# Patient Record
Sex: FEMALE | Race: BLACK OR AFRICAN AMERICAN
[De-identification: names, ages, dates, MRNs, and addresses within clinical notes are randomized per-mention and may not be internally consistent; named-entity substitution may affect disease eponyms.]

---

## 2018-09-20 ENCOUNTER — HOSPITAL ENCOUNTER (EMERGENCY)
Dept: HOSPITAL 94 - ER | Age: 43
Discharge: HOME | End: 2018-09-20
Payer: MEDICARE

## 2018-09-20 VITALS — BODY MASS INDEX: 21.3 KG/M2 | HEIGHT: 65 IN | WEIGHT: 127.87 LBS

## 2018-09-20 VITALS — DIASTOLIC BLOOD PRESSURE: 119 MMHG | SYSTOLIC BLOOD PRESSURE: 185 MMHG

## 2018-09-20 DIAGNOSIS — I10: ICD-10-CM

## 2018-09-20 DIAGNOSIS — Z88.8: ICD-10-CM

## 2018-09-20 DIAGNOSIS — K40.90: Primary | ICD-10-CM

## 2018-09-20 DIAGNOSIS — F17.200: ICD-10-CM

## 2018-09-20 DIAGNOSIS — Z98.51: ICD-10-CM

## 2018-09-20 LAB
ALBUMIN SERPL BCP-MCNC: 3.4 G/DL (ref 3.4–5)
ALBUMIN/GLOB SERPL: 0.8 {RATIO} (ref 1.1–1.5)
ALP SERPL-CCNC: 52 IU/L (ref 46–116)
ALT SERPL W P-5'-P-CCNC: 26 U/L (ref 12–78)
ANION GAP SERPL CALCULATED.3IONS-SCNC: 8 MMOL/L (ref 8–16)
AST SERPL W P-5'-P-CCNC: 20 U/L (ref 10–37)
BACTERIA URNS QL MICRO: (no result) /HPF
BASOPHILS # BLD AUTO: 0.1 X10'3 (ref 0–0.2)
BASOPHILS NFR BLD AUTO: 0.8 % (ref 0–1)
BILIRUB SERPL-MCNC: 0.3 MG/DL (ref 0.1–1)
BUN SERPL-MCNC: 20 MG/DL (ref 7–18)
BUN/CREAT SERPL: 18.7 (ref 6.6–38)
CALCIUM SERPL-MCNC: 8.9 MG/DL (ref 8.5–10.1)
CHLORIDE SERPL-SCNC: 105 MMOL/L (ref 99–107)
CLARITY UR: CLEAR
CO2 SERPL-SCNC: 28.2 MMOL/L (ref 24–32)
COLOR UR: YELLOW
CREAT SERPL-MCNC: 1.07 MG/DL (ref 0.4–0.9)
DEPRECATED SQUAMOUS URNS QL MICRO: (no result) /LPF
EOSINOPHIL # BLD AUTO: 0.2 X10'3 (ref 0–0.9)
EOSINOPHIL NFR BLD AUTO: 2.6 % (ref 0–6)
ERYTHROCYTE [DISTWIDTH] IN BLOOD BY AUTOMATED COUNT: 15.1 % (ref 11.5–14.5)
GFR SERPL CREATININE-BSD FRML MDRD: 57 ML/MIN
GLUCOSE SERPL-MCNC: 75 MG/DL (ref 70–104)
GLUCOSE UR STRIP-MCNC: NEGATIVE MG/DL
HCG UR QL: NEGATIVE
HCT VFR BLD AUTO: 34.6 % (ref 35–45)
HGB BLD-MCNC: 11.7 G/DL (ref 12–16)
HGB UR QL STRIP: (no result)
INR PPP: 1 INR
KETONES UR STRIP-MCNC: NEGATIVE MG/DL
LEUKOCYTE ESTERASE UR QL STRIP: NEGATIVE
LYMPHOCYTES # BLD AUTO: 3.5 X10'3 (ref 1.1–4.8)
LYMPHOCYTES NFR BLD AUTO: 53.3 % (ref 21–51)
MCH RBC QN AUTO: 29.2 PG (ref 27–31)
MCHC RBC AUTO-ENTMCNC: 33.7 % (ref 33–36.5)
MCV RBC AUTO: 86.8 FL (ref 78–98)
MONOCYTES # BLD AUTO: 0.5 X10'3 (ref 0–0.9)
MONOCYTES NFR BLD AUTO: 7.5 % (ref 2–12)
MUCOUS THREADS URNS QL MICRO: (no result) /LPF
NEUTROPHILS # BLD AUTO: 2.4 X10'3 (ref 1.8–7.7)
NEUTROPHILS NFR BLD AUTO: 35.8 % (ref 42–75)
NITRITE UR QL STRIP: NEGATIVE
PH UR STRIP: 6 [PH] (ref 4.8–8)
PLATELET # BLD AUTO: 209 X10'3 (ref 140–440)
PMV BLD AUTO: 9.1 FL (ref 7.4–10.4)
POTASSIUM SERPL-SCNC: 3 MMOL/L (ref 3.5–5.1)
PROT SERPL-MCNC: 7.6 G/DL (ref 6.4–8.2)
PROT UR QL STRIP: (no result) MG/DL
PROTHROMBIN TIME: 10.7 SECONDS (ref 9–12)
RBC # BLD AUTO: 3.99 X10'6 (ref 4.2–5.6)
RBC #/AREA URNS HPF: (no result) /HPF (ref 0–2)
SODIUM SERPL-SCNC: 141 MMOL/L (ref 135–145)
SP GR UR STRIP: >=1.03 (ref 1–1.03)
T VAGINALIS URNS QL MICRO: (no result) /HPF
URN COLLECT METHOD CLASS: (no result)
UROBILINOGEN UR STRIP-MCNC: 0.2 E.U/DL (ref 0.2–1)
WBC # BLD AUTO: 6.6 X10'3 (ref 4.5–11)
WBC #/AREA URNS HPF: (no result) /HPF (ref 0–4)

## 2018-09-20 PROCEDURE — 74177 CT ABD & PELVIS W/CONTRAST: CPT

## 2018-09-20 PROCEDURE — 36415 COLL VENOUS BLD VENIPUNCTURE: CPT

## 2018-09-20 PROCEDURE — 96375 TX/PRO/DX INJ NEW DRUG ADDON: CPT

## 2018-09-20 PROCEDURE — 85610 PROTHROMBIN TIME: CPT

## 2018-09-20 PROCEDURE — 85025 COMPLETE CBC W/AUTO DIFF WBC: CPT

## 2018-09-20 PROCEDURE — 81001 URINALYSIS AUTO W/SCOPE: CPT

## 2018-09-20 PROCEDURE — 81025 URINE PREGNANCY TEST: CPT

## 2018-09-20 PROCEDURE — 80053 COMPREHEN METABOLIC PANEL: CPT

## 2018-09-20 PROCEDURE — 96374 THER/PROPH/DIAG INJ IV PUSH: CPT

## 2018-09-20 PROCEDURE — 99285 EMERGENCY DEPT VISIT HI MDM: CPT

## 2021-04-30 ENCOUNTER — HOSPITAL ENCOUNTER (INPATIENT)
Dept: HOSPITAL 94 - ER | Age: 46
LOS: 1 days | Discharge: HOME | DRG: 364 | End: 2021-05-01
Attending: FAMILY MEDICINE | Admitting: FAMILY MEDICINE
Payer: MEDICAID

## 2021-04-30 VITALS — DIASTOLIC BLOOD PRESSURE: 124 MMHG | SYSTOLIC BLOOD PRESSURE: 193 MMHG

## 2021-04-30 VITALS — SYSTOLIC BLOOD PRESSURE: 183 MMHG | DIASTOLIC BLOOD PRESSURE: 113 MMHG

## 2021-04-30 VITALS — SYSTOLIC BLOOD PRESSURE: 158 MMHG | DIASTOLIC BLOOD PRESSURE: 93 MMHG

## 2021-04-30 VITALS — SYSTOLIC BLOOD PRESSURE: 165 MMHG | DIASTOLIC BLOOD PRESSURE: 102 MMHG

## 2021-04-30 VITALS — SYSTOLIC BLOOD PRESSURE: 180 MMHG | DIASTOLIC BLOOD PRESSURE: 107 MMHG

## 2021-04-30 VITALS — DIASTOLIC BLOOD PRESSURE: 80 MMHG | SYSTOLIC BLOOD PRESSURE: 140 MMHG

## 2021-04-30 VITALS — DIASTOLIC BLOOD PRESSURE: 120 MMHG | SYSTOLIC BLOOD PRESSURE: 186 MMHG

## 2021-04-30 VITALS — DIASTOLIC BLOOD PRESSURE: 110 MMHG | SYSTOLIC BLOOD PRESSURE: 192 MMHG

## 2021-04-30 VITALS — HEIGHT: 65 IN | WEIGHT: 176.37 LBS | BODY MASS INDEX: 29.38 KG/M2

## 2021-04-30 VITALS — DIASTOLIC BLOOD PRESSURE: 121 MMHG | SYSTOLIC BLOOD PRESSURE: 137 MMHG

## 2021-04-30 DIAGNOSIS — L02.413: ICD-10-CM

## 2021-04-30 DIAGNOSIS — I11.0: ICD-10-CM

## 2021-04-30 DIAGNOSIS — F17.210: ICD-10-CM

## 2021-04-30 DIAGNOSIS — Z86.718: ICD-10-CM

## 2021-04-30 DIAGNOSIS — E78.5: ICD-10-CM

## 2021-04-30 DIAGNOSIS — L03.113: Primary | ICD-10-CM

## 2021-04-30 DIAGNOSIS — Z98.51: ICD-10-CM

## 2021-04-30 DIAGNOSIS — E78.00: ICD-10-CM

## 2021-04-30 DIAGNOSIS — F15.10: ICD-10-CM

## 2021-04-30 DIAGNOSIS — M60.9: ICD-10-CM

## 2021-04-30 DIAGNOSIS — I50.9: ICD-10-CM

## 2021-04-30 DIAGNOSIS — Z20.822: ICD-10-CM

## 2021-04-30 LAB
ALBUMIN SERPL BCP-MCNC: 2.1 G/DL (ref 3.4–5)
ALBUMIN SERPL BCP-MCNC: 2.2 G/DL (ref 3.4–5)
ALBUMIN/GLOB SERPL: 0.4 {RATIO} (ref 1.1–1.5)
ALP SERPL-CCNC: 80 IU/L (ref 46–116)
ALT SERPL W P-5'-P-CCNC: 8 U/L (ref 12–78)
AMPHETAMINES UR QL SCN: POSITIVE
ANION GAP SERPL CALCULATED.3IONS-SCNC: 6 MMOL/L (ref 8–16)
ANION GAP SERPL CALCULATED.3IONS-SCNC: 8 MMOL/L (ref 8–16)
APTT PPP: 29 SECONDS (ref 22–32)
AST SERPL W P-5'-P-CCNC: 11 U/L (ref 10–37)
BARBITURATES UR QL SCN: NEGATIVE
BASOPHILS # BLD AUTO: 0 X10'3 (ref 0–0.2)
BASOPHILS # BLD AUTO: 0.1 X10'3 (ref 0–0.2)
BASOPHILS NFR BLD AUTO: 0.4 % (ref 0–1)
BASOPHILS NFR BLD AUTO: 1 % (ref 0–1)
BENZODIAZ UR QL SCN: NEGATIVE
BILIRUB SERPL-MCNC: 0.3 MG/DL (ref 0.1–1)
BUN SERPL-MCNC: 10 MG/DL (ref 7–18)
BUN SERPL-MCNC: 9 MG/DL (ref 7–18)
BUN/CREAT SERPL: 7.9 (ref 6.6–38)
BUN/CREAT SERPL: 8 (ref 6.6–38)
BZE UR QL SCN: NEGATIVE
CALCIUM SERPL-MCNC: 7.8 MG/DL (ref 8.5–10.1)
CALCIUM SERPL-MCNC: 8.4 MG/DL (ref 8.5–10.1)
CANNABINOIDS UR QL SCN: NEGATIVE
CHLORIDE SERPL-SCNC: 102 MMOL/L (ref 99–107)
CHLORIDE SERPL-SCNC: 103 MMOL/L (ref 99–107)
CO2 SERPL-SCNC: 28.8 MMOL/L (ref 24–32)
CO2 SERPL-SCNC: 29.6 MMOL/L (ref 24–32)
CREAT SERPL-MCNC: 1.13 MG/DL (ref 0.4–0.9)
CREAT SERPL-MCNC: 1.27 MG/DL (ref 0.4–0.9)
EOSINOPHIL # BLD AUTO: 0.1 X10'3 (ref 0–0.9)
EOSINOPHIL # BLD AUTO: 0.1 X10'3 (ref 0–0.9)
EOSINOPHIL NFR BLD AUTO: 1.1 % (ref 0–6)
EOSINOPHIL NFR BLD AUTO: 1.2 % (ref 0–6)
ERYTHROCYTE [DISTWIDTH] IN BLOOD BY AUTOMATED COUNT: 15.7 % (ref 11.5–14.5)
ERYTHROCYTE [DISTWIDTH] IN BLOOD BY AUTOMATED COUNT: 15.9 % (ref 11.5–14.5)
ETHANOL SERPL-MCNC: < 0.01 GM/DL (ref 0–0.01)
GFR SERPL CREATININE-BSD FRML MDRD: 56 ML/MIN
GFR SERPL CREATININE-BSD FRML MDRD: 64 ML/MIN
GLUCOSE SERPL-MCNC: 89 MG/DL (ref 70–104)
GLUCOSE SERPL-MCNC: 93 MG/DL (ref 70–104)
HCT VFR BLD AUTO: 24.8 % (ref 35–45)
HCT VFR BLD AUTO: 26.3 % (ref 35–45)
HGB BLD-MCNC: 8 G/DL (ref 12–16)
HGB BLD-MCNC: 8.6 G/DL (ref 12–16)
LYMPHOCYTES # BLD AUTO: 2.6 X10'3 (ref 1.1–4.8)
LYMPHOCYTES # BLD AUTO: 2.6 X10'3 (ref 1.1–4.8)
LYMPHOCYTES NFR BLD AUTO: 23.6 % (ref 21–51)
LYMPHOCYTES NFR BLD AUTO: 25.2 % (ref 21–51)
MAGNESIUM SERPL-MCNC: 1.8 MG/DL (ref 1.5–2.4)
MCH RBC QN AUTO: 27 PG (ref 27–31)
MCH RBC QN AUTO: 27.3 PG (ref 27–31)
MCHC RBC AUTO-ENTMCNC: 32.4 G/DL (ref 33–36.5)
MCHC RBC AUTO-ENTMCNC: 32.6 G/DL (ref 33–36.5)
MCV RBC AUTO: 83.2 FL (ref 78–98)
MCV RBC AUTO: 83.9 FL (ref 78–98)
METHADONE UR QL SCN: NEGATIVE
MONOCYTES # BLD AUTO: 0.8 X10'3 (ref 0–0.9)
MONOCYTES # BLD AUTO: 0.9 X10'3 (ref 0–0.9)
MONOCYTES NFR BLD AUTO: 7.5 % (ref 2–12)
MONOCYTES NFR BLD AUTO: 7.8 % (ref 2–12)
NEUTROPHILS # BLD AUTO: 6.8 X10'3 (ref 1.8–7.7)
NEUTROPHILS # BLD AUTO: 7.5 X10'3 (ref 1.8–7.7)
NEUTROPHILS NFR BLD AUTO: 65.1 % (ref 42–75)
NEUTROPHILS NFR BLD AUTO: 67.1 % (ref 42–75)
OPIATES UR QL SCN: POSITIVE
PCP UR QL SCN: NEGATIVE
PLATELET # BLD AUTO: 329 X10'3 (ref 140–440)
PLATELET # BLD AUTO: 358 X10'3 (ref 140–440)
PMV BLD AUTO: 8.2 FL (ref 7.4–10.4)
PMV BLD AUTO: 8.6 FL (ref 7.4–10.4)
POTASSIUM SERPL-SCNC: 2.9 MMOL/L (ref 3.5–5.1)
POTASSIUM SERPL-SCNC: 3.2 MMOL/L (ref 3.5–5.1)
PROT SERPL-MCNC: 7.9 G/DL (ref 6.4–8.2)
RBC # BLD AUTO: 2.98 X10'6 (ref 4.2–5.6)
RBC # BLD AUTO: 3.14 X10'6 (ref 4.2–5.6)
SODIUM SERPL-SCNC: 138 MMOL/L (ref 135–145)
SODIUM SERPL-SCNC: 140 MMOL/L (ref 135–145)
WBC # BLD AUTO: 10.4 X10'3 (ref 4.5–11)
WBC # BLD AUTO: 11.2 X10'3 (ref 4.5–11)

## 2021-04-30 PROCEDURE — 87077 CULTURE AEROBIC IDENTIFY: CPT

## 2021-04-30 PROCEDURE — 83605 ASSAY OF LACTIC ACID: CPT

## 2021-04-30 PROCEDURE — 80053 COMPREHEN METABOLIC PANEL: CPT

## 2021-04-30 PROCEDURE — 80320 DRUG SCREEN QUANTALCOHOLS: CPT

## 2021-04-30 PROCEDURE — 87040 BLOOD CULTURE FOR BACTERIA: CPT

## 2021-04-30 PROCEDURE — 93971 EXTREMITY STUDY: CPT

## 2021-04-30 PROCEDURE — 87186 SC STD MICRODIL/AGAR DIL: CPT

## 2021-04-30 PROCEDURE — 0J9G0ZZ DRAINAGE OF RIGHT LOWER ARM SUBCUTANEOUS TISSUE AND FASCIA, OPEN APPROACH: ICD-10-PCS | Performed by: ORTHOPAEDIC SURGERY

## 2021-04-30 PROCEDURE — 85025 COMPLETE CBC W/AUTO DIFF WBC: CPT

## 2021-04-30 PROCEDURE — 71045 X-RAY EXAM CHEST 1 VIEW: CPT

## 2021-04-30 PROCEDURE — 80305 DRUG TEST PRSMV DIR OPT OBS: CPT

## 2021-04-30 PROCEDURE — 36415 COLL VENOUS BLD VENIPUNCTURE: CPT

## 2021-04-30 PROCEDURE — 84145 PROCALCITONIN (PCT): CPT

## 2021-04-30 PROCEDURE — 99285 EMERGENCY DEPT VISIT HI MDM: CPT

## 2021-04-30 PROCEDURE — 85610 PROTHROMBIN TIME: CPT

## 2021-04-30 PROCEDURE — 87081 CULTURE SCREEN ONLY: CPT

## 2021-04-30 PROCEDURE — 83880 ASSAY OF NATRIURETIC PEPTIDE: CPT

## 2021-04-30 PROCEDURE — 87185 SC STD ENZYME DETCJ PER NZM: CPT

## 2021-04-30 PROCEDURE — 96365 THER/PROPH/DIAG IV INF INIT: CPT

## 2021-04-30 PROCEDURE — 87635 SARS-COV-2 COVID-19 AMP PRB: CPT

## 2021-04-30 PROCEDURE — 80048 BASIC METABOLIC PNL TOTAL CA: CPT

## 2021-04-30 PROCEDURE — 83735 ASSAY OF MAGNESIUM: CPT

## 2021-04-30 PROCEDURE — 87075 CULTR BACTERIA EXCEPT BLOOD: CPT

## 2021-04-30 PROCEDURE — 73206 CT ANGIO UPR EXTRM W/O&W/DYE: CPT

## 2021-04-30 PROCEDURE — 87070 CULTURE OTHR SPECIMN AEROBIC: CPT

## 2021-04-30 PROCEDURE — 85730 THROMBOPLASTIN TIME PARTIAL: CPT

## 2021-04-30 RX ADMIN — VANCOMYCIN HYDROCHLORIDE SCH MLS/HR: 750 INJECTION, POWDER, LYOPHILIZED, FOR SOLUTION INTRAVENOUS at 19:42

## 2021-04-30 RX ADMIN — Medication SCH MMU: at 19:42

## 2021-04-30 RX ADMIN — TAZOBACTAM SODIUM AND PIPERACILLIN SODIUM SCH MLS/HR: 500; 4 INJECTION, SOLUTION INTRAVENOUS at 16:44

## 2021-04-30 RX ADMIN — SODIUM CHLORIDE SCH MLS/HR: 9 INJECTION INTRAMUSCULAR; INTRAVENOUS; SUBCUTANEOUS at 21:11

## 2021-04-30 RX ADMIN — SODIUM CHLORIDE SCH MLS/HR: 9 INJECTION INTRAMUSCULAR; INTRAVENOUS; SUBCUTANEOUS at 09:32

## 2021-04-30 NOTE — NUR
Report called to receiving nurse. Transferred via [facility] . 

Special Issues communicated to receiving nurse. AWAKE AND ORIENTED. VITALS STABLE. DRESSING 
DI. STATES PAIN IMPROVING. TO ORTHO RM 4000 AT THIS TIME.

## 2021-04-30 NOTE — NUR
Problems reprioritized. Patient report given, questions answered & plan of care reviewed 
with AYAD ALLISON.

## 2021-04-30 NOTE — NUR
Patient in room ORTHO 4007. I have received report from MARIAN ALLISON and had the opportunity to 
ask questions and assume patient care.

## 2021-04-30 NOTE — NUR
PAGED DR JAIN RE: 

PAGER ID:  [phone] 

MESSAGE:  NENA MUJICA. /117. NO BP MEDS. O/N MARIAN 5199

## 2021-04-30 NOTE — NUR
Received from OR via , accompanied by Anesthesiologist DR PAREKH and report given by 
Anesthesiolgist. AWAKENS TO VOICE. VITALS STABLE. DRESSING DI. SUZANNE PAIN.

## 2021-04-30 NOTE — NUR
ATTEMPTED TO START IV X 2 WITHOUT SUCCESS. DR. JORDAN NOTIFIED AND CHARGE NURSE 
WILL ATTEMPT US GUIDED IV, WHEN ABLE.

## 2021-05-01 VITALS — SYSTOLIC BLOOD PRESSURE: 166 MMHG | DIASTOLIC BLOOD PRESSURE: 97 MMHG

## 2021-05-01 VITALS — SYSTOLIC BLOOD PRESSURE: 156 MMHG | DIASTOLIC BLOOD PRESSURE: 81 MMHG

## 2021-05-01 VITALS — SYSTOLIC BLOOD PRESSURE: 166 MMHG

## 2021-05-01 LAB
ALBUMIN SERPL BCP-MCNC: 2 G/DL (ref 3.4–5)
ANION GAP SERPL CALCULATED.3IONS-SCNC: 8 MMOL/L (ref 8–16)
BASOPHILS # BLD AUTO: 0.1 X10'3 (ref 0–0.2)
BASOPHILS NFR BLD AUTO: 0.8 % (ref 0–1)
BUN SERPL-MCNC: 8 MG/DL (ref 7–18)
BUN/CREAT SERPL: 6.6 (ref 6.6–38)
CALCIUM SERPL-MCNC: 8.1 MG/DL (ref 8.5–10.1)
CHLORIDE SERPL-SCNC: 105 MMOL/L (ref 99–107)
CO2 SERPL-SCNC: 27.5 MMOL/L (ref 24–32)
CREAT SERPL-MCNC: 1.21 MG/DL (ref 0.4–0.9)
EOSINOPHIL # BLD AUTO: 0.2 X10'3 (ref 0–0.9)
EOSINOPHIL NFR BLD AUTO: 2.3 % (ref 0–6)
ERYTHROCYTE [DISTWIDTH] IN BLOOD BY AUTOMATED COUNT: 16.2 % (ref 11.5–14.5)
GFR SERPL CREATININE-BSD FRML MDRD: 59 ML/MIN
GLUCOSE SERPL-MCNC: 116 MG/DL (ref 70–104)
HCT VFR BLD AUTO: 25.8 % (ref 35–45)
HGB BLD-MCNC: 8.7 G/DL (ref 12–16)
LYMPHOCYTES # BLD AUTO: 2.9 X10'3 (ref 1.1–4.8)
LYMPHOCYTES NFR BLD AUTO: 29.7 % (ref 21–51)
MCH RBC QN AUTO: 27.8 PG (ref 27–31)
MCHC RBC AUTO-ENTMCNC: 33.5 G/DL (ref 33–36.5)
MCV RBC AUTO: 83.1 FL (ref 78–98)
MONOCYTES # BLD AUTO: 0.6 X10'3 (ref 0–0.9)
MONOCYTES NFR BLD AUTO: 6 % (ref 2–12)
NEUTROPHILS # BLD AUTO: 6 X10'3 (ref 1.8–7.7)
NEUTROPHILS NFR BLD AUTO: 61.2 % (ref 42–75)
PLATELET # BLD AUTO: 376 X10'3 (ref 140–440)
PMV BLD AUTO: 8.4 FL (ref 7.4–10.4)
POTASSIUM SERPL-SCNC: 2.9 MMOL/L (ref 3.5–5.1)
RBC # BLD AUTO: 3.11 X10'6 (ref 4.2–5.6)
SODIUM SERPL-SCNC: 140 MMOL/L (ref 135–145)
WBC # BLD AUTO: 9.7 X10'3 (ref 4.5–11)

## 2021-05-01 RX ADMIN — TAZOBACTAM SODIUM AND PIPERACILLIN SODIUM SCH MLS/HR: 500; 4 INJECTION, SOLUTION INTRAVENOUS at 00:05

## 2021-05-01 RX ADMIN — VANCOMYCIN HYDROCHLORIDE SCH MLS/HR: 750 INJECTION, POWDER, LYOPHILIZED, FOR SOLUTION INTRAVENOUS at 08:34

## 2021-05-01 RX ADMIN — SODIUM CHLORIDE SCH MLS/HR: 9 INJECTION INTRAMUSCULAR; INTRAVENOUS; SUBCUTANEOUS at 00:40

## 2021-05-01 RX ADMIN — Medication SCH MMU: at 08:35

## 2021-05-01 NOTE — NUR
PAGED DR JAIN RE:

PAGER ID:  [phone] 

MESSAGE:  NENA MUJICA. RAFITA 2.9 NO PROTOCOL. AND PT WANTS TO LEAVE AMA. O/N MARIAN 8505

## 2021-05-01 NOTE — NUR
PT LEFT AMA. STATED THAT SHE NEEDED TO LEAVE BECAUSE SHE NEEDED TO PAY RENT OR SHE WOULD BE 
HOMELESS. STATES [facility] WILL NOT GIVE HER ANYMORE TIME. I EDUCATED PT ON THE RISKS OF 
LEAVING AMA, EXPLAINED THAT SHE NEEDS IV ABX AND PAIN CONTROL. HER K IS ALSO CRITICAL 2.9 
AND NEEDS TO BE REPLACED. SHE STATES SHE DOESNT CARE ABOUT THAT, SHE JUST NEEDS TO LEAVE. 
[facility] HER CAR PAYMENT. IV WAS 
DCd CANULA INTACT, PT AGREEABLE TO ONE DOSE OF K BEFORE LEAVING.

## 2021-05-01 NOTE — NUR
Problems reprioritized. Patient report given, questions answered & plan of care reviewed 
with MARIAN ALLISON.

## 2021-05-01 NOTE — NUR
Patient in room ORTHO 4007. I have received report from  AYAD ALLISON  and had the opportunity 
to ask questions and assume patient care.

## 2021-05-01 NOTE — NUR
PT REFUSING MORNING LAB DRAW. I TRIED TO EDUCATE HER ON THE IMPORTANCE OF LABS VALUES AND 
MONITORING. SHE STATES SHE WANTS THEM DONE IN AN HOUR. LAB AWARE

## 2021-09-08 ENCOUNTER — HOSPITAL ENCOUNTER (INPATIENT)
Dept: HOSPITAL 94 - ER | Age: 46
LOS: 2 days | Discharge: HOME | DRG: 134 | End: 2021-09-10
Attending: INTERNAL MEDICINE | Admitting: FAMILY MEDICINE
Payer: MEDICAID

## 2021-09-08 VITALS — HEIGHT: 65 IN | BODY MASS INDEX: 23.95 KG/M2 | WEIGHT: 143.74 LBS

## 2021-09-08 DIAGNOSIS — Z91.14: ICD-10-CM

## 2021-09-08 DIAGNOSIS — D64.9: ICD-10-CM

## 2021-09-08 DIAGNOSIS — E78.5: ICD-10-CM

## 2021-09-08 DIAGNOSIS — E78.00: ICD-10-CM

## 2021-09-08 DIAGNOSIS — Z59.0: ICD-10-CM

## 2021-09-08 DIAGNOSIS — Z87.440: ICD-10-CM

## 2021-09-08 DIAGNOSIS — I16.1: ICD-10-CM

## 2021-09-08 DIAGNOSIS — R09.02: ICD-10-CM

## 2021-09-08 DIAGNOSIS — I26.94: Primary | ICD-10-CM

## 2021-09-08 DIAGNOSIS — N39.0: ICD-10-CM

## 2021-09-08 DIAGNOSIS — I27.29: ICD-10-CM

## 2021-09-08 DIAGNOSIS — I42.9: ICD-10-CM

## 2021-09-08 DIAGNOSIS — Z86.718: ICD-10-CM

## 2021-09-08 DIAGNOSIS — Z79.01: ICD-10-CM

## 2021-09-08 DIAGNOSIS — I27.81: ICD-10-CM

## 2021-09-08 DIAGNOSIS — J44.9: ICD-10-CM

## 2021-09-08 DIAGNOSIS — I08.1: ICD-10-CM

## 2021-09-08 DIAGNOSIS — N17.9: ICD-10-CM

## 2021-09-08 DIAGNOSIS — Z79.899: ICD-10-CM

## 2021-09-08 DIAGNOSIS — F15.10: ICD-10-CM

## 2021-09-08 DIAGNOSIS — Z71.51: ICD-10-CM

## 2021-09-08 DIAGNOSIS — N18.9: ICD-10-CM

## 2021-09-08 DIAGNOSIS — F17.210: ICD-10-CM

## 2021-09-08 DIAGNOSIS — I13.0: ICD-10-CM

## 2021-09-08 DIAGNOSIS — F41.9: ICD-10-CM

## 2021-09-08 DIAGNOSIS — E87.6: ICD-10-CM

## 2021-09-08 DIAGNOSIS — Z71.6: ICD-10-CM

## 2021-09-08 DIAGNOSIS — Z98.51: ICD-10-CM

## 2021-09-08 DIAGNOSIS — Z20.822: ICD-10-CM

## 2021-09-08 DIAGNOSIS — I21.A1: ICD-10-CM

## 2021-09-08 DIAGNOSIS — I50.23: ICD-10-CM

## 2021-09-08 DIAGNOSIS — Z91.19: ICD-10-CM

## 2021-09-08 LAB
ALBUMIN SERPL BCP-MCNC: 3.4 G/DL (ref 3.4–5)
ALBUMIN/GLOB SERPL: 0.6 {RATIO} (ref 1.1–1.5)
ALP SERPL-CCNC: 85 IU/L (ref 46–116)
ALT SERPL W P-5'-P-CCNC: 20 U/L (ref 12–78)
AMPHETAMINES UR QL SCN: POSITIVE
ANION GAP SERPL CALCULATED.3IONS-SCNC: 10 MMOL/L (ref 8–16)
ANISOCYTOSIS BLD QL SMEAR: (no result)
APTT PPP: 26 SECONDS (ref 22–32)
AST SERPL W P-5'-P-CCNC: 25 U/L (ref 10–37)
BACTERIA URNS QL MICRO: (no result) /HPF
BARBITURATES UR QL SCN: NEGATIVE
BASOPHILS # BLD AUTO: 0.1 X10'3 (ref 0–0.2)
BASOPHILS NFR BLD AUTO: 0.9 % (ref 0–1)
BENZODIAZ UR QL SCN: NEGATIVE
BILIRUB SERPL-MCNC: 1 MG/DL (ref 0.1–1)
BUN SERPL-MCNC: 25 MG/DL (ref 7–18)
BUN/CREAT SERPL: 14 (ref 6.6–38)
BZE UR QL SCN: NEGATIVE
CALCIUM SERPL-MCNC: 9.1 MG/DL (ref 8.5–10.1)
CANNABINOIDS UR QL SCN: NEGATIVE
CHLORIDE SERPL-SCNC: 102 MMOL/L (ref 99–107)
CLARITY UR: CLEAR
CO2 SERPL-SCNC: 28.5 MMOL/L (ref 24–32)
COLOR UR: YELLOW
CREAT SERPL-MCNC: 1.78 MG/DL (ref 0.4–0.9)
D DIMER PPP FEU-MCNC: 1.78 MG/L FEU (ref 0–0.5)
DEPRECATED SQUAMOUS URNS QL MICRO: (no result) /LPF
EOSINOPHIL # BLD AUTO: 0.1 X10'3 (ref 0–0.9)
EOSINOPHIL NFR BLD AUTO: 1.6 % (ref 0–6)
ERYTHROCYTE [DISTWIDTH] IN BLOOD BY AUTOMATED COUNT: 18.8 % (ref 11.5–14.5)
GFR SERPL CREATININE-BSD FRML MDRD: 37 ML/MIN
GLUCOSE SERPL-MCNC: 120 MG/DL (ref 70–104)
GLUCOSE UR STRIP-MCNC: NEGATIVE MG/DL
HBA1C MFR BLD: 5.7 % (ref 4.5–6.2)
HCG UR QL: NEGATIVE
HCT VFR BLD AUTO: 32.9 % (ref 35–45)
HGB BLD-MCNC: 10.6 G/DL (ref 12–16)
HGB UR QL STRIP: (no result)
KETONES UR STRIP-MCNC: NEGATIVE MG/DL
LEUKOCYTE ESTERASE UR QL STRIP: (no result)
LYMPHOCYTES # BLD AUTO: 3.3 X10'3 (ref 1.1–4.8)
LYMPHOCYTES NFR BLD AUTO: 43.7 % (ref 21–51)
MACROCYTES BLD QL SMEAR: (no result)
MAGNESIUM SERPL-MCNC: 2 MG/DL (ref 1.5–2.4)
MCH RBC QN AUTO: 26.7 PG (ref 27–31)
MCHC RBC AUTO-ENTMCNC: 32.1 G/DL (ref 33–36.5)
MCV RBC AUTO: 83.3 FL (ref 78–98)
METHADONE UR QL SCN: NEGATIVE
MONOCYTES # BLD AUTO: 0.3 X10'3 (ref 0–0.9)
MONOCYTES NFR BLD AUTO: 4.1 % (ref 2–12)
MUCOUS THREADS URNS QL MICRO: (no result) /LPF
NEUTROPHILS # BLD AUTO: 3.7 X10'3 (ref 1.8–7.7)
NEUTROPHILS NFR BLD AUTO: 49.7 % (ref 42–75)
NITRITE UR QL STRIP: NEGATIVE
OPIATES UR QL SCN: POSITIVE
PCP UR QL SCN: NEGATIVE
PH UR STRIP: 6 [PH] (ref 4.8–8)
PHOSPHATE SERPL-MCNC: 3.6 MG/DL (ref 2.3–4.5)
PLATELET # BLD AUTO: 288 X10'3 (ref 140–440)
PLATELET BLD QL SMEAR: NORMAL
PMV BLD AUTO: 8.6 FL (ref 7.4–10.4)
POTASSIUM SERPL-SCNC: 2.9 MMOL/L (ref 3.5–5.1)
PROT SERPL-MCNC: 9.3 G/DL (ref 6.4–8.2)
PROT UR QL STRIP: 100 MG/DL
RBC # BLD AUTO: 3.95 X10'6 (ref 4.2–5.6)
RBC #/AREA URNS HPF: (no result) /HPF (ref 0–2)
RBC MORPH BLD: (no result)
SODIUM SERPL-SCNC: 140 MMOL/L (ref 135–145)
SP GR UR STRIP: 1.02 (ref 1–1.03)
URN COLLECT METHOD CLASS: (no result)
UROBILINOGEN UR STRIP-MCNC: 1 E.U/DL (ref 0.2–1)
WBC # BLD AUTO: 7.5 X10'3 (ref 4.5–11)
WBC #/AREA URNS HPF: (no result) /HPF (ref 0–4)

## 2021-09-08 PROCEDURE — 81025 URINE PREGNANCY TEST: CPT

## 2021-09-08 PROCEDURE — 85730 THROMBOPLASTIN TIME PARTIAL: CPT

## 2021-09-08 PROCEDURE — 87088 URINE BACTERIA CULTURE: CPT

## 2021-09-08 PROCEDURE — 93005 ELECTROCARDIOGRAM TRACING: CPT

## 2021-09-08 PROCEDURE — 80305 DRUG TEST PRSMV DIR OPT OBS: CPT

## 2021-09-08 PROCEDURE — 36415 COLL VENOUS BLD VENIPUNCTURE: CPT

## 2021-09-08 PROCEDURE — 83036 HEMOGLOBIN GLYCOSYLATED A1C: CPT

## 2021-09-08 PROCEDURE — 99285 EMERGENCY DEPT VISIT HI MDM: CPT

## 2021-09-08 PROCEDURE — 81001 URINALYSIS AUTO W/SCOPE: CPT

## 2021-09-08 PROCEDURE — B32T1ZZ COMPUTERIZED TOMOGRAPHY (CT SCAN) OF LEFT PULMONARY ARTERY USING LOW OSMOLAR CONTRAST: ICD-10-PCS

## 2021-09-08 PROCEDURE — 93017 CV STRESS TEST TRACING ONLY: CPT

## 2021-09-08 PROCEDURE — 80061 LIPID PANEL: CPT

## 2021-09-08 PROCEDURE — 78452 HT MUSCLE IMAGE SPECT MULT: CPT

## 2021-09-08 PROCEDURE — 83880 ASSAY OF NATRIURETIC PEPTIDE: CPT

## 2021-09-08 PROCEDURE — B32S1ZZ COMPUTERIZED TOMOGRAPHY (CT SCAN) OF RIGHT PULMONARY ARTERY USING LOW OSMOLAR CONTRAST: ICD-10-PCS

## 2021-09-08 PROCEDURE — 84100 ASSAY OF PHOSPHORUS: CPT

## 2021-09-08 PROCEDURE — 80053 COMPREHEN METABOLIC PANEL: CPT

## 2021-09-08 PROCEDURE — 85025 COMPLETE CBC W/AUTO DIFF WBC: CPT

## 2021-09-08 PROCEDURE — 83735 ASSAY OF MAGNESIUM: CPT

## 2021-09-08 PROCEDURE — 71275 CT ANGIOGRAPHY CHEST: CPT

## 2021-09-08 PROCEDURE — 85008 BL SMEAR W/O DIFF WBC COUNT: CPT

## 2021-09-08 PROCEDURE — 93306 TTE W/DOPPLER COMPLETE: CPT

## 2021-09-08 PROCEDURE — 96374 THER/PROPH/DIAG INJ IV PUSH: CPT

## 2021-09-08 PROCEDURE — 84443 ASSAY THYROID STIM HORMONE: CPT

## 2021-09-08 PROCEDURE — 71045 X-RAY EXAM CHEST 1 VIEW: CPT

## 2021-09-08 PROCEDURE — 84132 ASSAY OF SERUM POTASSIUM: CPT

## 2021-09-08 PROCEDURE — B3201ZZ COMPUTERIZED TOMOGRAPHY (CT SCAN) OF THORACIC AORTA USING LOW OSMOLAR CONTRAST: ICD-10-PCS

## 2021-09-08 PROCEDURE — 84484 ASSAY OF TROPONIN QUANT: CPT

## 2021-09-08 PROCEDURE — 96375 TX/PRO/DX INJ NEW DRUG ADDON: CPT

## 2021-09-08 PROCEDURE — 85379 FIBRIN DEGRADATION QUANT: CPT

## 2021-09-08 PROCEDURE — 93970 EXTREMITY STUDY: CPT

## 2021-09-08 PROCEDURE — 87635 SARS-COV-2 COVID-19 AMP PRB: CPT

## 2021-09-08 RX ADMIN — HEPARIN SODIUM AND DEXTROSE SCH MLS/HR: 10000; 5 INJECTION INTRAVENOUS at 23:16

## 2021-09-08 SDOH — ECONOMIC STABILITY - HOUSING INSECURITY: HOMELESSNESS: Z59.0

## 2021-09-08 NOTE — NUR
Pt is difficult piv placement d/t hx ivda.  Multiple attempts to arms 
unsuccessful. 2nd attempts at EJ successful, 2G LEJ.  Pt will need cardiene and 
heparin and mg and potassium infusions and will need 2nd line.  Multiple 
attempts in unsuccessful for US placement by charge KEEGAN Arriola.  JUSTUS Nesbitt aware. 
Cardien gtt infusing now.

## 2021-09-09 VITALS — DIASTOLIC BLOOD PRESSURE: 82 MMHG | SYSTOLIC BLOOD PRESSURE: 140 MMHG

## 2021-09-09 VITALS — DIASTOLIC BLOOD PRESSURE: 108 MMHG | SYSTOLIC BLOOD PRESSURE: 165 MMHG

## 2021-09-09 VITALS — SYSTOLIC BLOOD PRESSURE: 149 MMHG | DIASTOLIC BLOOD PRESSURE: 99 MMHG

## 2021-09-09 VITALS — SYSTOLIC BLOOD PRESSURE: 155 MMHG | DIASTOLIC BLOOD PRESSURE: 101 MMHG

## 2021-09-09 VITALS — SYSTOLIC BLOOD PRESSURE: 155 MMHG | DIASTOLIC BLOOD PRESSURE: 105 MMHG

## 2021-09-09 VITALS — DIASTOLIC BLOOD PRESSURE: 110 MMHG | SYSTOLIC BLOOD PRESSURE: 163 MMHG

## 2021-09-09 VITALS — DIASTOLIC BLOOD PRESSURE: 109 MMHG | SYSTOLIC BLOOD PRESSURE: 163 MMHG

## 2021-09-09 VITALS — DIASTOLIC BLOOD PRESSURE: 80 MMHG | SYSTOLIC BLOOD PRESSURE: 148 MMHG

## 2021-09-09 VITALS — SYSTOLIC BLOOD PRESSURE: 165 MMHG | DIASTOLIC BLOOD PRESSURE: 103 MMHG

## 2021-09-09 VITALS — SYSTOLIC BLOOD PRESSURE: 163 MMHG | DIASTOLIC BLOOD PRESSURE: 109 MMHG

## 2021-09-09 VITALS — SYSTOLIC BLOOD PRESSURE: 168 MMHG | DIASTOLIC BLOOD PRESSURE: 112 MMHG

## 2021-09-09 VITALS — SYSTOLIC BLOOD PRESSURE: 134 MMHG | DIASTOLIC BLOOD PRESSURE: 81 MMHG

## 2021-09-09 VITALS — SYSTOLIC BLOOD PRESSURE: 172 MMHG | DIASTOLIC BLOOD PRESSURE: 117 MMHG

## 2021-09-09 LAB
ALBUMIN SERPL BCP-MCNC: 2.9 G/DL (ref 3.4–5)
ALBUMIN/GLOB SERPL: 0.5 {RATIO} (ref 1.1–1.5)
ALP SERPL-CCNC: 76 IU/L (ref 46–116)
ALT SERPL W P-5'-P-CCNC: 18 U/L (ref 12–78)
ANION GAP SERPL CALCULATED.3IONS-SCNC: 9 MMOL/L (ref 8–16)
ANISOCYTOSIS BLD QL SMEAR: (no result)
AST SERPL W P-5'-P-CCNC: 22 U/L (ref 10–37)
BASOPHILS # BLD AUTO: 0.1 X10'3 (ref 0–0.2)
BASOPHILS NFR BLD AUTO: 1.2 % (ref 0–1)
BILIRUB SERPL-MCNC: 0.9 MG/DL (ref 0.1–1)
BUN SERPL-MCNC: 22 MG/DL (ref 7–18)
BUN/CREAT SERPL: 15.1 (ref 6.6–38)
CALCIUM SERPL-MCNC: 8.6 MG/DL (ref 8.5–10.1)
CHLORIDE SERPL-SCNC: 101 MMOL/L (ref 99–107)
CHOLEST SERPL-MCNC: 105 MG/DL (ref 0–200)
CHOLEST/HDLC SERPL: 3.3 {RATIO} (ref 0–4.99)
CO2 SERPL-SCNC: 31.9 MMOL/L (ref 24–32)
CREAT SERPL-MCNC: 1.46 MG/DL (ref 0.4–0.9)
EOSINOPHIL # BLD AUTO: 0.2 X10'3 (ref 0–0.9)
EOSINOPHIL NFR BLD AUTO: 2.4 % (ref 0–6)
ERYTHROCYTE [DISTWIDTH] IN BLOOD BY AUTOMATED COUNT: 18.8 % (ref 11.5–14.5)
GFR SERPL CREATININE-BSD FRML MDRD: 47 ML/MIN
GLUCOSE SERPL-MCNC: 87 MG/DL (ref 70–104)
HCT VFR BLD AUTO: 31.5 % (ref 35–45)
HDLC SERPL-MCNC: 32 MG/DL (ref 35–60)
HGB BLD-MCNC: 10.2 G/DL (ref 12–16)
HYPOCHROMIA BLD QL SMEAR: (no result)
LDLC SERPL DIRECT ASSAY-MCNC: 58 MG/DL (ref 50–100)
LYMPHOCYTES # BLD AUTO: 2.8 X10'3 (ref 1.1–4.8)
LYMPHOCYTES NFR BLD AUTO: 35.4 % (ref 21–51)
MCH RBC QN AUTO: 27.1 PG (ref 27–31)
MCHC RBC AUTO-ENTMCNC: 32.4 G/DL (ref 33–36.5)
MCV RBC AUTO: 83.7 FL (ref 78–98)
MONOCYTES # BLD AUTO: 0.3 X10'3 (ref 0–0.9)
MONOCYTES NFR BLD AUTO: 3.9 % (ref 2–12)
NEUTROPHILS # BLD AUTO: 4.5 X10'3 (ref 1.8–7.7)
NEUTROPHILS NFR BLD AUTO: 57.1 % (ref 42–75)
PLATELET # BLD AUTO: 275 X10'3 (ref 140–440)
PLATELET BLD QL SMEAR: NORMAL
PMV BLD AUTO: 9 FL (ref 7.4–10.4)
POLYCHROMASIA BLD QL SMEAR: (no result)
POTASSIUM SERPL-SCNC: 2.5 MMOL/L (ref 3.5–5.1)
PROT SERPL-MCNC: 8.3 G/DL (ref 6.4–8.2)
RBC # BLD AUTO: 3.76 X10'6 (ref 4.2–5.6)
RBC MORPH BLD: (no result)
SODIUM SERPL-SCNC: 142 MMOL/L (ref 135–145)
TRIGL SERPL-MCNC: 73 MG/DL (ref 20–135)
WBC # BLD AUTO: 7.8 X10'3 (ref 4.5–11)

## 2021-09-09 PROCEDURE — 3E073KZ INTRODUCTION OF OTHER DIAGNOSTIC SUBSTANCE INTO CORONARY ARTERY, PERCUTANEOUS APPROACH: ICD-10-PCS

## 2021-09-09 PROCEDURE — 4A02XM4 MEASUREMENT OF CARDIAC TOTAL ACTIVITY, EXTERNAL APPROACH: ICD-10-PCS

## 2021-09-09 RX ADMIN — Medication SCH MMU: at 20:51

## 2021-09-09 RX ADMIN — Medication PRN MLS/HR: at 10:49

## 2021-09-09 RX ADMIN — DOCUSATE SODIUM SCH MG: 100 CAPSULE, LIQUID FILLED ORAL at 08:42

## 2021-09-09 RX ADMIN — HYDROCODONE BITARTRATE AND ACETAMINOPHEN PRN TAB: 10; 325 TABLET ORAL at 20:56

## 2021-09-09 RX ADMIN — HEPARIN SODIUM AND DEXTROSE SCH MLS/HR: 10000; 5 INJECTION INTRAVENOUS at 08:14

## 2021-09-09 RX ADMIN — PANTOPRAZOLE SODIUM SCH MG: 40 TABLET, DELAYED RELEASE ORAL at 11:17

## 2021-09-09 RX ADMIN — DOCUSATE SODIUM SCH MG: 100 CAPSULE, LIQUID FILLED ORAL at 20:51

## 2021-09-09 RX ADMIN — Medication PRN MLS/HR: at 22:56

## 2021-09-09 NOTE — NUR
Problems reprioritized. Patient report given, questions answered & plan of care reviewed 
with Mona ALLISON.

## 2021-09-09 NOTE — NUR
Paged Dr. Herring 

PAGER ID: [phone]

MESSAGE: U  2013B Jonathan, Baljeet, Janae Scan completed, Currently there are no results 
posted. Pt would like to know when she can eat. Please advise. I have expressed we need to 
wait for results. 8156 Kika ALLISON

## 2021-09-09 NOTE — NUR
Patient in room ED 8. I have received report from  Anna ALLISON  and had the opportunity to ask 
questions and assume patient care.

## 2021-09-09 NOTE — NUR
Paged Dr. Herring

PAGER ID: [phone]

MESSAGE: [facility] 2013B Jonathan, Baljeet, Janae Scan results in: No evidence of ischemia Small 
infarct versus artifactual defect mid inferior wall. Do we need to continue Heparin gtt? or 
Discontinue Heparin gtt. 2130 Kika ALLISON

## 2021-09-09 NOTE — NUR
pt is stable in bed. no acute resp distress noted at this time. gait is steady. 
Cardizem drip titrated down and stopped. pt blood pressure systolic maintaining 
between 116-120/ pt is sleeping with with all needs met

## 2021-09-09 NOTE — NUR
Patient in room PCU 3013. I have received report from ADELAIDA ALLISON   and had the opportunity 
to ask questions and assume patient care.

## 2021-09-10 VITALS — SYSTOLIC BLOOD PRESSURE: 155 MMHG | DIASTOLIC BLOOD PRESSURE: 100 MMHG

## 2021-09-10 VITALS — DIASTOLIC BLOOD PRESSURE: 102 MMHG | SYSTOLIC BLOOD PRESSURE: 143 MMHG

## 2021-09-10 VITALS — SYSTOLIC BLOOD PRESSURE: 154 MMHG | DIASTOLIC BLOOD PRESSURE: 101 MMHG

## 2021-09-10 VITALS — DIASTOLIC BLOOD PRESSURE: 109 MMHG | SYSTOLIC BLOOD PRESSURE: 171 MMHG

## 2021-09-10 LAB
ALBUMIN SERPL BCP-MCNC: 2.6 G/DL (ref 3.4–5)
ALBUMIN/GLOB SERPL: 0.5 {RATIO} (ref 1.1–1.5)
ALP SERPL-CCNC: 64 IU/L (ref 46–116)
ALT SERPL W P-5'-P-CCNC: 17 U/L (ref 12–78)
ANION GAP SERPL CALCULATED.3IONS-SCNC: 7 MMOL/L (ref 8–16)
ANISOCYTOSIS BLD QL SMEAR: (no result)
AST SERPL W P-5'-P-CCNC: 24 U/L (ref 10–37)
BASOPHILS # BLD AUTO: 0.1 X10'3 (ref 0–0.2)
BASOPHILS NFR BLD AUTO: 1.4 % (ref 0–1)
BILIRUB SERPL-MCNC: 0.4 MG/DL (ref 0.1–1)
BUN SERPL-MCNC: 31 MG/DL (ref 7–18)
BUN/CREAT SERPL: 17.7 (ref 6.6–38)
CALCIUM SERPL-MCNC: 8.3 MG/DL (ref 8.5–10.1)
CHLORIDE SERPL-SCNC: 104 MMOL/L (ref 99–107)
CO2 SERPL-SCNC: 28.3 MMOL/L (ref 24–32)
CREAT SERPL-MCNC: 1.75 MG/DL (ref 0.4–0.9)
EOSINOPHIL # BLD AUTO: 0.2 X10'3 (ref 0–0.9)
EOSINOPHIL NFR BLD AUTO: 2.5 % (ref 0–6)
ERYTHROCYTE [DISTWIDTH] IN BLOOD BY AUTOMATED COUNT: 19.3 % (ref 11.5–14.5)
GFR SERPL CREATININE-BSD FRML MDRD: 38 ML/MIN
GLUCOSE SERPL-MCNC: 99 MG/DL (ref 70–104)
HCT VFR BLD AUTO: 32.5 % (ref 35–45)
HGB BLD-MCNC: 10.4 G/DL (ref 12–16)
HYPOCHROMIA BLD QL SMEAR: (no result)
LYMPHOCYTES # BLD AUTO: 2.6 X10'3 (ref 1.1–4.8)
LYMPHOCYTES NFR BLD AUTO: 39.4 % (ref 21–51)
MCH RBC QN AUTO: 27.1 PG (ref 27–31)
MCHC RBC AUTO-ENTMCNC: 32.1 G/DL (ref 33–36.5)
MCV RBC AUTO: 84.6 FL (ref 78–98)
MONOCYTES # BLD AUTO: 0.3 X10'3 (ref 0–0.9)
MONOCYTES NFR BLD AUTO: 5.1 % (ref 2–12)
NEUTROPHILS # BLD AUTO: 3.5 X10'3 (ref 1.8–7.7)
NEUTROPHILS NFR BLD AUTO: 51.6 % (ref 42–75)
PLATELET # BLD AUTO: 262 X10'3 (ref 140–440)
PLATELET BLD QL SMEAR: NORMAL
PMV BLD AUTO: 9.2 FL (ref 7.4–10.4)
POTASSIUM SERPL-SCNC: 3.7 MMOL/L (ref 3.5–5.1)
PROT SERPL-MCNC: 7.5 G/DL (ref 6.4–8.2)
RBC # BLD AUTO: 3.84 X10'6 (ref 4.2–5.6)
RBC MORPH BLD: (no result)
SODIUM SERPL-SCNC: 139 MMOL/L (ref 135–145)
WBC # BLD AUTO: 6.7 X10'3 (ref 4.5–11)

## 2021-09-10 RX ADMIN — Medication SCH MMU: at 07:17

## 2021-09-10 RX ADMIN — DOCUSATE SODIUM SCH MG: 100 CAPSULE, LIQUID FILLED ORAL at 07:22

## 2021-09-10 RX ADMIN — HYDROCODONE BITARTRATE AND ACETAMINOPHEN PRN TAB: 10; 325 TABLET ORAL at 05:29

## 2021-09-10 RX ADMIN — PANTOPRAZOLE SODIUM SCH MG: 40 TABLET, DELAYED RELEASE ORAL at 07:18

## 2021-09-10 NOTE — NUR
Patient stable for discharge.  talked with patient on the phone before 
discharge regarding resources for living. Patient states she does not have any home 
medications. Dr. Herring made aware and states to call in her home medications. [facility] off 
[address] was called and they said Eliquis, Cephalexin, Furosemide, and Lactobacillus are 
available for . The pharmacist was not available to take call in prescriptions at 
this time for her Amlodipine, Benazepril, Carvedilol, and Spironolactone, so they said to 
leave a voicemessage of the prescriptions. Voicemail was left for the pharmacist. PIV was 
removed on BRENT and left neck with catheter intact. Tele monitor removed. Patient transferred 
off unit with all personal belongings to personal vehicle.

## 2021-09-10 NOTE — NUR
Problems reprioritized. Patient report given, questions answered & plan of care reviewed 
with JIM ALLISON.

## 2021-12-13 ENCOUNTER — HOSPITAL ENCOUNTER (EMERGENCY)
Dept: HOSPITAL 94 - ER | Age: 46
LOS: 1 days | Discharge: LEFT BEFORE DISCHARGE | End: 2021-12-14
Payer: MEDICAID

## 2021-12-13 VITALS — WEIGHT: 150.31 LBS | BODY MASS INDEX: 23.59 KG/M2 | HEIGHT: 67 IN

## 2021-12-13 DIAGNOSIS — R06.02: Primary | ICD-10-CM

## 2021-12-13 DIAGNOSIS — Z53.21: ICD-10-CM

## 2021-12-13 PROCEDURE — 93005 ELECTROCARDIOGRAM TRACING: CPT

## 2021-12-13 PROCEDURE — 71045 X-RAY EXAM CHEST 1 VIEW: CPT

## 2021-12-14 VITALS — SYSTOLIC BLOOD PRESSURE: 179 MMHG | DIASTOLIC BLOOD PRESSURE: 133 MMHG

## 2022-01-03 ENCOUNTER — HOSPITAL ENCOUNTER (INPATIENT)
Dept: HOSPITAL 94 - ER | Age: 47
LOS: 1 days | Discharge: LEFT BEFORE DISCHARGE | DRG: 194 | End: 2022-01-04
Attending: INTERNAL MEDICINE | Admitting: GENERAL PRACTICE
Payer: MEDICAID

## 2022-01-03 VITALS — WEIGHT: 143.3 LBS | BODY MASS INDEX: 23.88 KG/M2 | HEIGHT: 65 IN

## 2022-01-03 DIAGNOSIS — Z86.711: ICD-10-CM

## 2022-01-03 DIAGNOSIS — F41.9: ICD-10-CM

## 2022-01-03 DIAGNOSIS — N18.30: ICD-10-CM

## 2022-01-03 DIAGNOSIS — Z79.899: ICD-10-CM

## 2022-01-03 DIAGNOSIS — Z91.14: ICD-10-CM

## 2022-01-03 DIAGNOSIS — Z86.718: ICD-10-CM

## 2022-01-03 DIAGNOSIS — I50.23: ICD-10-CM

## 2022-01-03 DIAGNOSIS — I13.0: Primary | ICD-10-CM

## 2022-01-03 DIAGNOSIS — I16.1: ICD-10-CM

## 2022-01-03 DIAGNOSIS — F15.10: ICD-10-CM

## 2022-01-03 DIAGNOSIS — Z71.51: ICD-10-CM

## 2022-01-03 DIAGNOSIS — F17.210: ICD-10-CM

## 2022-01-03 DIAGNOSIS — E78.00: ICD-10-CM

## 2022-01-03 DIAGNOSIS — Z53.29: ICD-10-CM

## 2022-01-03 DIAGNOSIS — I21.A1: ICD-10-CM

## 2022-01-03 DIAGNOSIS — Z98.51: ICD-10-CM

## 2022-01-03 DIAGNOSIS — Z91.19: ICD-10-CM

## 2022-01-03 DIAGNOSIS — Z71.6: ICD-10-CM

## 2022-01-03 DIAGNOSIS — E87.6: ICD-10-CM

## 2022-01-03 DIAGNOSIS — D64.9: ICD-10-CM

## 2022-01-03 DIAGNOSIS — Z82.49: ICD-10-CM

## 2022-01-03 DIAGNOSIS — E78.5: ICD-10-CM

## 2022-01-03 PROCEDURE — 71045 X-RAY EXAM CHEST 1 VIEW: CPT

## 2022-01-03 PROCEDURE — 81001 URINALYSIS AUTO W/SCOPE: CPT

## 2022-01-03 PROCEDURE — 96372 THER/PROPH/DIAG INJ SC/IM: CPT

## 2022-01-03 PROCEDURE — 84484 ASSAY OF TROPONIN QUANT: CPT

## 2022-01-03 PROCEDURE — 87088 URINE BACTERIA CULTURE: CPT

## 2022-01-03 PROCEDURE — 84132 ASSAY OF SERUM POTASSIUM: CPT

## 2022-01-03 PROCEDURE — 83735 ASSAY OF MAGNESIUM: CPT

## 2022-01-03 PROCEDURE — 36415 COLL VENOUS BLD VENIPUNCTURE: CPT

## 2022-01-03 PROCEDURE — 93005 ELECTROCARDIOGRAM TRACING: CPT

## 2022-01-03 PROCEDURE — 99285 EMERGENCY DEPT VISIT HI MDM: CPT

## 2022-01-03 PROCEDURE — 80305 DRUG TEST PRSMV DIR OPT OBS: CPT

## 2022-01-03 PROCEDURE — 85008 BL SMEAR W/O DIFF WBC COUNT: CPT

## 2022-01-03 PROCEDURE — 80053 COMPREHEN METABOLIC PANEL: CPT

## 2022-01-03 PROCEDURE — 85025 COMPLETE CBC W/AUTO DIFF WBC: CPT

## 2022-01-03 PROCEDURE — 96375 TX/PRO/DX INJ NEW DRUG ADDON: CPT

## 2022-01-03 PROCEDURE — 83880 ASSAY OF NATRIURETIC PEPTIDE: CPT

## 2022-01-03 PROCEDURE — 96374 THER/PROPH/DIAG INJ IV PUSH: CPT

## 2022-01-03 PROCEDURE — 93306 TTE W/DOPPLER COMPLETE: CPT

## 2022-01-04 VITALS — DIASTOLIC BLOOD PRESSURE: 97 MMHG | SYSTOLIC BLOOD PRESSURE: 145 MMHG

## 2022-01-04 LAB
ALBUMIN SERPL BCP-MCNC: 2.8 G/DL (ref 3.4–5)
ALBUMIN/GLOB SERPL: 0.5 {RATIO} (ref 1.1–1.5)
ALP SERPL-CCNC: 72 IU/L (ref 46–116)
ALT SERPL W P-5'-P-CCNC: 22 U/L (ref 12–78)
AMPHETAMINES UR QL SCN: POSITIVE
ANION GAP SERPL CALCULATED.3IONS-SCNC: 8 MMOL/L (ref 8–16)
ANISOCYTOSIS BLD QL SMEAR: (no result)
AST SERPL W P-5'-P-CCNC: 28 U/L (ref 10–37)
BACTERIA URNS QL MICRO: (no result) /HPF
BARBITURATES UR QL SCN: NEGATIVE
BASOPHILS # BLD AUTO: 0.1 X10'3 (ref 0–0.2)
BASOPHILS NFR BLD AUTO: 0.7 % (ref 0–1)
BENZODIAZ UR QL SCN: NEGATIVE
BILIRUB SERPL-MCNC: 1.4 MG/DL (ref 0.1–1)
BUN SERPL-MCNC: 26 MG/DL (ref 7–18)
BUN/CREAT SERPL: 18.3 (ref 6.6–38)
BZE UR QL SCN: NEGATIVE
CALCIUM SERPL-MCNC: 8.8 MG/DL (ref 8.5–10.1)
CANNABINOIDS UR QL SCN: NEGATIVE
CHLORIDE SERPL-SCNC: 100 MMOL/L (ref 99–107)
CLARITY UR: (no result)
CO2 SERPL-SCNC: 32 MMOL/L (ref 24–32)
COLOR UR: YELLOW
CREAT SERPL-MCNC: 1.42 MG/DL (ref 0.4–0.9)
DEPRECATED SQUAMOUS URNS QL MICRO: (no result) /LPF
EOSINOPHIL # BLD AUTO: 0 X10'3 (ref 0–0.9)
EOSINOPHIL NFR BLD AUTO: 0.3 % (ref 0–6)
ERYTHROCYTE [DISTWIDTH] IN BLOOD BY AUTOMATED COUNT: 19.1 % (ref 11.5–14.5)
FINE GRAN CASTS URNS QL MICRO: (no result) /LPF
GFR SERPL CREATININE-BSD FRML MDRD: 49 ML/MIN
GLUCOSE SERPL-MCNC: 116 MG/DL (ref 70–104)
GLUCOSE UR STRIP-MCNC: NEGATIVE MG/DL
HCT VFR BLD AUTO: 31.6 % (ref 35–45)
HGB BLD-MCNC: 10.1 G/DL (ref 12–16)
HGB UR QL STRIP: (no result)
HYPOCHROMIA BLD QL SMEAR: (no result)
KETONES UR STRIP-MCNC: NEGATIVE MG/DL
LEUKOCYTE ESTERASE UR QL STRIP: NEGATIVE
LYMPHOCYTES # BLD AUTO: 1.9 X10'3 (ref 1.1–4.8)
LYMPHOCYTES NFR BLD AUTO: 26 % (ref 21–51)
MAGNESIUM SERPL-MCNC: 2.2 MG/DL (ref 1.5–2.4)
MCH RBC QN AUTO: 26.2 PG (ref 27–31)
MCHC RBC AUTO-ENTMCNC: 31.8 G/DL (ref 33–36.5)
MCV RBC AUTO: 82.2 FL (ref 78–98)
METHADONE UR QL SCN: NEGATIVE
MONOCYTES # BLD AUTO: 0.2 X10'3 (ref 0–0.9)
MONOCYTES NFR BLD AUTO: 2.6 % (ref 2–12)
MUCOUS THREADS URNS QL MICRO: (no result) /LPF
NEUTROPHILS # BLD AUTO: 5.3 X10'3 (ref 1.8–7.7)
NEUTROPHILS NFR BLD AUTO: 70.4 % (ref 42–75)
NITRITE UR QL STRIP: NEGATIVE
OPIATES UR QL SCN: POSITIVE
PCP UR QL SCN: NEGATIVE
PH UR STRIP: 6.5 [PH] (ref 4.8–8)
PLATELET # BLD AUTO: 315 X10'3 (ref 140–440)
PLATELET BLD QL SMEAR: NORMAL
PMV BLD AUTO: 8.5 FL (ref 7.4–10.4)
POLYCHROMASIA BLD QL SMEAR: (no result)
POTASSIUM SERPL-SCNC: 2.5 MMOL/L (ref 3.5–5.1)
POTASSIUM SERPL-SCNC: 3.3 MMOL/L (ref 3.5–5.1)
PROT SERPL-MCNC: 7.9 G/DL (ref 6.4–8.2)
PROT UR QL STRIP: >=300 MG/DL
RBC # BLD AUTO: 3.85 X10'6 (ref 4.2–5.6)
RBC #/AREA URNS HPF: (no result) /HPF (ref 0–2)
RBC MORPH BLD: (no result)
SODIUM SERPL-SCNC: 140 MMOL/L (ref 135–145)
SP GR UR STRIP: 1.02 (ref 1–1.03)
TRANS CELLS URNS QL MICRO: (no result) /HPF
URN COLLECT METHOD CLASS: (no result)
UROBILINOGEN UR STRIP-MCNC: 1 E.U/DL (ref 0.2–1)
WBC # BLD AUTO: 7.5 X10'3 (ref 4.5–11)
WBC #/AREA URNS HPF: (no result) /HPF (ref 0–4)

## 2022-01-04 RX ADMIN — POTASSIUM CHLORIDE PRN MLS/HR: 7.46 INJECTION, SOLUTION INTRAVENOUS at 08:44

## 2022-01-04 RX ADMIN — POTASSIUM CHLORIDE PRN MLS/HR: 7.46 INJECTION, SOLUTION INTRAVENOUS at 07:35

## 2022-01-04 NOTE — NUR
ASSUMED CARE OF PT. WENT IN TO INTRODUCE SELF TO PT AND PT SEEN LAYING SIDEWAYS 
IN THE BED AND BED SHEET SOAKED WITH URINE. INFORMED PT OF FINDINGS AND NEED TO 
DO MORNING VITALS AND PT REFUSED. STATES SHE DOESNT WANT TO GET UP RIGHT NOW 
AND DOESNT WANT VITALS, "MAYBE LATER." EDUCATED PT IT IS UNSAFE AND UNHYGENIC 
TO LAY IN SHEETS COVERED WITH URINE AND IS AT RISK FOR SKIN BREAKDOWN AND 
INFECTION. PT REPLIED "I DONT CARE I'M NOT GETTING UP RIGHT NOW." EDUCATED THAT 
HER BLOOD PRESSURE HAS BEEN HIGH AND THIS WRITER WOULD LIKE TO ASSESS HER AND 
HER VITALS. PT CONTINUED TO REFUSE SAYING "NOT RIGHT NOW."

## 2022-01-04 NOTE — NUR
NOTIFIED DR BEHL THAT PT WANT TO LEAVE AMA AS PER MD TELL THE PT MD WILL HERE 
IN FEW MINS,NOTIFIED THE PT PT SAID "I WANT TO LEAVE TAKE THAT IV OUT ,I CAN'T 
WAIT".

## 2022-01-04 NOTE — NUR
Notified Dr Lobato of patient's elevated BP. Doctor gave order for dose of 4mg 
Morphine. Doctor aware of BP coming down to 172/131 - doctor expressed this was 
okay and to wait for further blood pressure medication scheduled for this 
morning.

## 2022-01-26 ENCOUNTER — HOSPITAL ENCOUNTER (INPATIENT)
Dept: HOSPITAL 94 - ER | Age: 47
LOS: 1 days | Discharge: HOME | DRG: 194 | End: 2022-01-27
Attending: GENERAL PRACTICE | Admitting: HOSPITALIST
Payer: MEDICAID

## 2022-01-26 VITALS — BODY MASS INDEX: 25.82 KG/M2 | WEIGHT: 154.98 LBS | HEIGHT: 65 IN

## 2022-01-26 DIAGNOSIS — Z91.19: ICD-10-CM

## 2022-01-26 DIAGNOSIS — N18.30: ICD-10-CM

## 2022-01-26 DIAGNOSIS — E78.00: ICD-10-CM

## 2022-01-26 DIAGNOSIS — I42.9: ICD-10-CM

## 2022-01-26 DIAGNOSIS — Z79.01: ICD-10-CM

## 2022-01-26 DIAGNOSIS — I16.1: ICD-10-CM

## 2022-01-26 DIAGNOSIS — Z88.8: ICD-10-CM

## 2022-01-26 DIAGNOSIS — Z79.899: ICD-10-CM

## 2022-01-26 DIAGNOSIS — F41.9: ICD-10-CM

## 2022-01-26 DIAGNOSIS — Z20.822: ICD-10-CM

## 2022-01-26 DIAGNOSIS — I13.0: Primary | ICD-10-CM

## 2022-01-26 DIAGNOSIS — N17.9: ICD-10-CM

## 2022-01-26 DIAGNOSIS — Z86.718: ICD-10-CM

## 2022-01-26 DIAGNOSIS — Z86.711: ICD-10-CM

## 2022-01-26 DIAGNOSIS — F17.210: ICD-10-CM

## 2022-01-26 DIAGNOSIS — F15.10: ICD-10-CM

## 2022-01-26 DIAGNOSIS — I21.A1: ICD-10-CM

## 2022-01-26 DIAGNOSIS — I50.23: ICD-10-CM

## 2022-01-26 DIAGNOSIS — Z98.51: ICD-10-CM

## 2022-01-26 LAB
ALBUMIN SERPL BCP-MCNC: 3.4 G/DL (ref 3.4–5)
ALBUMIN/GLOB SERPL: 0.6 {RATIO} (ref 1.1–1.5)
ALP SERPL-CCNC: 103 IU/L (ref 46–116)
ALT SERPL W P-5'-P-CCNC: 27 U/L (ref 12–78)
ANION GAP SERPL CALCULATED.3IONS-SCNC: 9 MMOL/L (ref 8–16)
ANISOCYTOSIS BLD QL SMEAR: (no result)
AST SERPL W P-5'-P-CCNC: 33 U/L (ref 10–37)
BASOPHILS # BLD AUTO: 0.1 X10'3 (ref 0–0.2)
BASOPHILS NFR BLD AUTO: 0.9 % (ref 0–1)
BILIRUB SERPL-MCNC: 1.8 MG/DL (ref 0.1–1)
BUN SERPL-MCNC: 35 MG/DL (ref 7–18)
BUN/CREAT SERPL: 22.3 (ref 6.6–38)
CALCIUM SERPL-MCNC: 9.7 MG/DL (ref 8.5–10.1)
CHLORIDE SERPL-SCNC: 96 MMOL/L (ref 99–107)
CO2 SERPL-SCNC: 28 MMOL/L (ref 24–32)
CREAT SERPL-MCNC: 1.57 MG/DL (ref 0.4–0.9)
EOSINOPHIL # BLD AUTO: 0 X10'3 (ref 0–0.9)
EOSINOPHIL NFR BLD AUTO: 0.4 % (ref 0–6)
ERYTHROCYTE [DISTWIDTH] IN BLOOD BY AUTOMATED COUNT: 19.1 % (ref 11.5–14.5)
GFR SERPL CREATININE-BSD FRML MDRD: 43 ML/MIN
GLUCOSE SERPL-MCNC: 113 MG/DL (ref 70–104)
HCT VFR BLD AUTO: 41.5 % (ref 35–45)
HGB BLD-MCNC: 13.1 G/DL (ref 12–16)
LG PLATELETS BLD QL SMEAR: (no result)
LYMPHOCYTES # BLD AUTO: 3 X10'3 (ref 1.1–4.8)
LYMPHOCYTES NFR BLD AUTO: 37.5 % (ref 21–51)
MACROCYTES BLD QL SMEAR: (no result)
MCH RBC QN AUTO: 25.7 PG (ref 27–31)
MCHC RBC AUTO-ENTMCNC: 31.4 G/DL (ref 33–36.5)
MCV RBC AUTO: 81.6 FL (ref 78–98)
MONOCYTES # BLD AUTO: 0.2 X10'3 (ref 0–0.9)
MONOCYTES NFR BLD AUTO: 2.5 % (ref 2–12)
NEUTROPHILS # BLD AUTO: 4.7 X10'3 (ref 1.8–7.7)
NEUTROPHILS NFR BLD AUTO: 58.7 % (ref 42–75)
PLATELET # BLD AUTO: 353 X10'3 (ref 140–440)
PLATELET BLD QL SMEAR: NORMAL
PMV BLD AUTO: 8.4 FL (ref 7.4–10.4)
POTASSIUM SERPL-SCNC: 3.3 MMOL/L (ref 3.5–5.1)
PROT SERPL-MCNC: 9.4 G/DL (ref 6.4–8.2)
RBC # BLD AUTO: 5.09 X10'6 (ref 4.2–5.6)
RBC MORPH BLD: (no result)
SODIUM SERPL-SCNC: 133 MMOL/L (ref 135–145)
WBC # BLD AUTO: 8 X10'3 (ref 4.5–11)

## 2022-01-26 PROCEDURE — 80053 COMPREHEN METABOLIC PANEL: CPT

## 2022-01-26 PROCEDURE — 93005 ELECTROCARDIOGRAM TRACING: CPT

## 2022-01-26 PROCEDURE — 87635 SARS-COV-2 COVID-19 AMP PRB: CPT

## 2022-01-26 PROCEDURE — 36415 COLL VENOUS BLD VENIPUNCTURE: CPT

## 2022-01-26 PROCEDURE — 71045 X-RAY EXAM CHEST 1 VIEW: CPT

## 2022-01-26 PROCEDURE — 87081 CULTURE SCREEN ONLY: CPT

## 2022-01-26 PROCEDURE — 85610 PROTHROMBIN TIME: CPT

## 2022-01-26 PROCEDURE — 85008 BL SMEAR W/O DIFF WBC COUNT: CPT

## 2022-01-26 PROCEDURE — 96375 TX/PRO/DX INJ NEW DRUG ADDON: CPT

## 2022-01-26 PROCEDURE — 83880 ASSAY OF NATRIURETIC PEPTIDE: CPT

## 2022-01-26 PROCEDURE — 85025 COMPLETE CBC W/AUTO DIFF WBC: CPT

## 2022-01-26 PROCEDURE — 99285 EMERGENCY DEPT VISIT HI MDM: CPT

## 2022-01-26 PROCEDURE — 96374 THER/PROPH/DIAG INJ IV PUSH: CPT

## 2022-01-26 PROCEDURE — 80048 BASIC METABOLIC PNL TOTAL CA: CPT

## 2022-01-26 PROCEDURE — 84484 ASSAY OF TROPONIN QUANT: CPT

## 2022-01-26 RX ADMIN — DOCUSATE SODIUM SCH MG: 100 CAPSULE, LIQUID FILLED ORAL at 20:00

## 2022-01-27 VITALS — DIASTOLIC BLOOD PRESSURE: 105 MMHG | SYSTOLIC BLOOD PRESSURE: 153 MMHG

## 2022-01-27 VITALS — SYSTOLIC BLOOD PRESSURE: 145 MMHG | DIASTOLIC BLOOD PRESSURE: 104 MMHG

## 2022-01-27 VITALS — SYSTOLIC BLOOD PRESSURE: 149 MMHG | DIASTOLIC BLOOD PRESSURE: 115 MMHG

## 2022-01-27 VITALS — SYSTOLIC BLOOD PRESSURE: 132 MMHG | DIASTOLIC BLOOD PRESSURE: 114 MMHG

## 2022-01-27 VITALS — DIASTOLIC BLOOD PRESSURE: 103 MMHG | SYSTOLIC BLOOD PRESSURE: 158 MMHG

## 2022-01-27 LAB
ALBUMIN SERPL BCP-MCNC: 2.3 G/DL (ref 3.4–5)
ANION GAP SERPL CALCULATED.3IONS-SCNC: 7 MMOL/L (ref 8–16)
BASOPHILS # BLD AUTO: 0.1 X10'3 (ref 0–0.2)
BASOPHILS NFR BLD AUTO: 1 % (ref 0–1)
BUN SERPL-MCNC: 50 MG/DL (ref 7–18)
BUN/CREAT SERPL: 26.5 (ref 6.6–38)
CALCIUM SERPL-MCNC: 8.2 MG/DL (ref 8.5–10.1)
CHLORIDE SERPL-SCNC: 102 MMOL/L (ref 99–107)
CO2 SERPL-SCNC: 29.2 MMOL/L (ref 24–32)
CREAT SERPL-MCNC: 1.89 MG/DL (ref 0.4–0.9)
EOSINOPHIL # BLD AUTO: 0.1 X10'3 (ref 0–0.9)
EOSINOPHIL NFR BLD AUTO: 1.1 % (ref 0–6)
ERYTHROCYTE [DISTWIDTH] IN BLOOD BY AUTOMATED COUNT: 18.6 % (ref 11.5–14.5)
GFR SERPL CREATININE-BSD FRML MDRD: 35 ML/MIN
GLUCOSE SERPL-MCNC: 113 MG/DL (ref 70–104)
HCT VFR BLD AUTO: 31 % (ref 35–45)
HGB BLD-MCNC: 9.9 G/DL (ref 12–16)
LYMPHOCYTES # BLD AUTO: 3.2 X10'3 (ref 1.1–4.8)
LYMPHOCYTES NFR BLD AUTO: 41.7 % (ref 21–51)
MCH RBC QN AUTO: 25.9 PG (ref 27–31)
MCHC RBC AUTO-ENTMCNC: 31.9 G/DL (ref 33–36.5)
MCV RBC AUTO: 81.1 FL (ref 78–98)
MONOCYTES # BLD AUTO: 0.3 X10'3 (ref 0–0.9)
MONOCYTES NFR BLD AUTO: 4.1 % (ref 2–12)
NEUTROPHILS # BLD AUTO: 4 X10'3 (ref 1.8–7.7)
NEUTROPHILS NFR BLD AUTO: 52.1 % (ref 42–75)
PLATELET # BLD AUTO: 295 X10'3 (ref 140–440)
PMV BLD AUTO: 8.9 FL (ref 7.4–10.4)
POTASSIUM SERPL-SCNC: 3.7 MMOL/L (ref 3.5–5.1)
RBC # BLD AUTO: 3.83 X10'6 (ref 4.2–5.6)
SODIUM SERPL-SCNC: 138 MMOL/L (ref 135–145)
WBC # BLD AUTO: 7.8 X10'3 (ref 4.5–11)

## 2022-01-27 RX ADMIN — DOCUSATE SODIUM SCH MG: 100 CAPSULE, LIQUID FILLED ORAL at 08:28

## 2022-01-27 NOTE — NUR
PAGER ID:  [phone] 

MESSAGE:  Patient Jonathan Castillo [id number] adm for decompensated HR BP is 153/105 HR 80-90Sat 
97 RA. Denies Blurry vision, headache . + C/O pain. Received 2mg morphine at 7[address]25. Ativan 
0.5mg tab now. Restoril 4hr ago. Please need your best advice?

## 2022-01-27 NOTE — NUR
positive for MRSA on nasal swab... MD notified.



PAGER ID:  [phone] 

MESSAGE:  [id number]: doc, just to inform you that laboratory called to inform us that 
patient is (+) for MRSA on her nasal swab.

## 2022-01-27 NOTE — NUR
Page sent out to Dr. Aragon about patient /103 HR 90. and worsening of pain upon 
admission to pcu. Patient C/O pain of 10/10 after 2mg of morphine at 0038, Ativan 0.5mg tab 
0141, HS Restoril 4hr ago in the ED. patient states she has not slept for a few  days now. 
Patient RR and HR remain stable. 

patient is currently sitting in bed. All safety measures implemented. Will continue monitor 
patient

## 2022-01-27 NOTE — NUR
discharge instructions were given and explained to patient, iv access discontinued with 
cannula tip complete and intact.

## 2022-02-18 ENCOUNTER — HOSPITAL ENCOUNTER (EMERGENCY)
Dept: HOSPITAL 94 - ER | Age: 47
Discharge: HOME | End: 2022-02-18
Payer: MEDICAID

## 2022-02-18 VITALS — BODY MASS INDEX: 32.32 KG/M2 | WEIGHT: 194.01 LBS | HEIGHT: 65 IN

## 2022-02-18 VITALS — SYSTOLIC BLOOD PRESSURE: 198 MMHG | DIASTOLIC BLOOD PRESSURE: 129 MMHG

## 2022-02-18 DIAGNOSIS — Z88.6: ICD-10-CM

## 2022-02-18 DIAGNOSIS — I11.0: ICD-10-CM

## 2022-02-18 DIAGNOSIS — F15.90: ICD-10-CM

## 2022-02-18 DIAGNOSIS — I50.9: Primary | ICD-10-CM

## 2022-02-18 DIAGNOSIS — Z20.822: ICD-10-CM

## 2022-02-18 DIAGNOSIS — Z86.718: ICD-10-CM

## 2022-02-18 DIAGNOSIS — R60.0: ICD-10-CM

## 2022-02-18 DIAGNOSIS — F41.9: ICD-10-CM

## 2022-02-18 DIAGNOSIS — Z79.899: ICD-10-CM

## 2022-02-18 DIAGNOSIS — F17.200: ICD-10-CM

## 2022-02-18 DIAGNOSIS — Z98.51: ICD-10-CM

## 2022-02-18 DIAGNOSIS — R06.02: ICD-10-CM

## 2022-02-18 LAB
ALBUMIN SERPL BCP-MCNC: 2.4 G/DL (ref 3.4–5)
ALBUMIN/GLOB SERPL: 0.6 {RATIO} (ref 1.1–1.5)
ALP SERPL-CCNC: 103 IU/L (ref 46–116)
ALT SERPL W P-5'-P-CCNC: 47 U/L (ref 12–78)
ANION GAP SERPL CALCULATED.3IONS-SCNC: 13 MMOL/L (ref 8–16)
ANISOCYTOSIS BLD QL SMEAR: (no result)
AST SERPL W P-5'-P-CCNC: 28 U/L (ref 10–37)
BASOPHILS # BLD AUTO: 0.1 X10'3 (ref 0–0.2)
BASOPHILS NFR BLD AUTO: 1 % (ref 0–1)
BILIRUB SERPL-MCNC: 0.4 MG/DL (ref 0.1–1)
BUN SERPL-MCNC: 42 MG/DL (ref 7–18)
BUN/CREAT SERPL: 21.4 (ref 6.6–38)
CALCIUM SERPL-MCNC: 7.9 MG/DL (ref 8.5–10.1)
CHLORIDE SERPL-SCNC: 105 MMOL/L (ref 99–107)
CO2 SERPL-SCNC: 24.3 MMOL/L (ref 24–32)
CREAT SERPL-MCNC: 1.96 MG/DL (ref 0.4–0.9)
EOSINOPHIL # BLD AUTO: 0.1 X10'3 (ref 0–0.9)
EOSINOPHIL NFR BLD AUTO: 0.8 % (ref 0–6)
ERYTHROCYTE [DISTWIDTH] IN BLOOD BY AUTOMATED COUNT: 19.6 % (ref 11.5–14.5)
GFR SERPL CREATININE-BSD FRML MDRD: 34 ML/MIN
GLUCOSE SERPL-MCNC: 112 MG/DL (ref 70–104)
HCT VFR BLD AUTO: 30.4 % (ref 35–45)
HGB BLD-MCNC: 9.9 G/DL (ref 12–16)
HYPOCHROMIA BLD QL SMEAR: (no result)
LYMPHOCYTES # BLD AUTO: 2.3 X10'3 (ref 1.1–4.8)
LYMPHOCYTES NFR BLD AUTO: 31.7 % (ref 21–51)
MCH RBC QN AUTO: 24.1 PG (ref 27–31)
MCHC RBC AUTO-ENTMCNC: 32.7 G/DL (ref 33–36.5)
MCV RBC AUTO: 73.7 FL (ref 78–98)
MICROCYTES BLD QL SMEAR: (no result)
MONOCYTES # BLD AUTO: 0.5 X10'3 (ref 0–0.9)
MONOCYTES NFR BLD AUTO: 6.3 % (ref 2–12)
NEUTROPHILS # BLD AUTO: 4.4 X10'3 (ref 1.8–7.7)
NEUTROPHILS NFR BLD AUTO: 60.2 % (ref 42–75)
OVALOCYTES BLD QL SMEAR: (no result)
PLATELET # BLD AUTO: 278 X10'3 (ref 140–440)
PLATELET BLD QL SMEAR: NORMAL
PMV BLD AUTO: 8 FL (ref 7.4–10.4)
POIKILOCYTOSIS BLD QL SMEAR: (no result)
POLYCHROMASIA BLD QL SMEAR: (no result)
POTASSIUM SERPL-SCNC: 3.7 MMOL/L (ref 3.5–5.1)
PROT SERPL-MCNC: 6.6 G/DL (ref 6.4–8.2)
RBC # BLD AUTO: 4.12 X10'6 (ref 4.2–5.6)
RBC MORPH BLD: (no result)
SODIUM SERPL-SCNC: 142 MMOL/L (ref 135–145)
TARGETS BLD QL SMEAR: (no result)
WBC # BLD AUTO: 7.3 X10'3 (ref 4.5–11)

## 2022-02-18 PROCEDURE — 85008 BL SMEAR W/O DIFF WBC COUNT: CPT

## 2022-02-18 PROCEDURE — 93005 ELECTROCARDIOGRAM TRACING: CPT

## 2022-02-18 PROCEDURE — 80053 COMPREHEN METABOLIC PANEL: CPT

## 2022-02-18 PROCEDURE — 96374 THER/PROPH/DIAG INJ IV PUSH: CPT

## 2022-02-18 PROCEDURE — 36415 COLL VENOUS BLD VENIPUNCTURE: CPT

## 2022-02-18 PROCEDURE — 87635 SARS-COV-2 COVID-19 AMP PRB: CPT

## 2022-02-18 PROCEDURE — 83880 ASSAY OF NATRIURETIC PEPTIDE: CPT

## 2022-02-18 PROCEDURE — 85025 COMPLETE CBC W/AUTO DIFF WBC: CPT

## 2022-02-18 PROCEDURE — 84484 ASSAY OF TROPONIN QUANT: CPT

## 2022-02-18 PROCEDURE — 99285 EMERGENCY DEPT VISIT HI MDM: CPT

## 2022-02-18 PROCEDURE — 71045 X-RAY EXAM CHEST 1 VIEW: CPT

## 2022-02-18 PROCEDURE — 93925 LOWER EXTREMITY STUDY: CPT

## 2022-02-18 PROCEDURE — 96375 TX/PRO/DX INJ NEW DRUG ADDON: CPT

## 2022-02-25 ENCOUNTER — HOSPITAL ENCOUNTER (INPATIENT)
Dept: HOSPITAL 94 - ER | Age: 47
LOS: 2 days | Discharge: LEFT BEFORE DISCHARGE | DRG: 199 | End: 2022-02-27
Attending: GENERAL PRACTICE | Admitting: GENERAL PRACTICE
Payer: MEDICAID

## 2022-02-25 VITALS — SYSTOLIC BLOOD PRESSURE: 120 MMHG | DIASTOLIC BLOOD PRESSURE: 75 MMHG

## 2022-02-25 VITALS — HEIGHT: 65 IN | BODY MASS INDEX: 25.04 KG/M2 | WEIGHT: 150.31 LBS

## 2022-02-25 VITALS — DIASTOLIC BLOOD PRESSURE: 79 MMHG | SYSTOLIC BLOOD PRESSURE: 114 MMHG

## 2022-02-25 VITALS — DIASTOLIC BLOOD PRESSURE: 79 MMHG | SYSTOLIC BLOOD PRESSURE: 128 MMHG

## 2022-02-25 VITALS — DIASTOLIC BLOOD PRESSURE: 71 MMHG | SYSTOLIC BLOOD PRESSURE: 111 MMHG

## 2022-02-25 VITALS — SYSTOLIC BLOOD PRESSURE: 128 MMHG | DIASTOLIC BLOOD PRESSURE: 79 MMHG

## 2022-02-25 DIAGNOSIS — I16.1: Primary | ICD-10-CM

## 2022-02-25 DIAGNOSIS — E78.00: ICD-10-CM

## 2022-02-25 DIAGNOSIS — R00.0: ICD-10-CM

## 2022-02-25 DIAGNOSIS — F17.210: ICD-10-CM

## 2022-02-25 DIAGNOSIS — I21.A1: ICD-10-CM

## 2022-02-25 DIAGNOSIS — I13.0: ICD-10-CM

## 2022-02-25 DIAGNOSIS — Z79.899: ICD-10-CM

## 2022-02-25 DIAGNOSIS — F15.10: ICD-10-CM

## 2022-02-25 DIAGNOSIS — Z88.6: ICD-10-CM

## 2022-02-25 DIAGNOSIS — I42.0: ICD-10-CM

## 2022-02-25 DIAGNOSIS — D50.9: ICD-10-CM

## 2022-02-25 DIAGNOSIS — N18.4: ICD-10-CM

## 2022-02-25 DIAGNOSIS — Z82.49: ICD-10-CM

## 2022-02-25 DIAGNOSIS — Z86.711: ICD-10-CM

## 2022-02-25 DIAGNOSIS — Z53.29: ICD-10-CM

## 2022-02-25 DIAGNOSIS — F41.9: ICD-10-CM

## 2022-02-25 DIAGNOSIS — I50.23: ICD-10-CM

## 2022-02-25 DIAGNOSIS — E87.6: ICD-10-CM

## 2022-02-25 DIAGNOSIS — Z86.718: ICD-10-CM

## 2022-02-25 DIAGNOSIS — Z98.51: ICD-10-CM

## 2022-02-25 LAB
ALBUMIN SERPL BCP-MCNC: 2.5 G/DL (ref 3.4–5)
ALBUMIN/GLOB SERPL: 0.5 {RATIO} (ref 1.1–1.5)
ALP SERPL-CCNC: 140 IU/L (ref 46–116)
ALT SERPL W P-5'-P-CCNC: 46 U/L (ref 12–78)
AMPHETAMINES UR QL SCN: NEGATIVE
ANION GAP SERPL CALCULATED.3IONS-SCNC: 11 MMOL/L (ref 8–16)
ANISOCYTOSIS BLD QL SMEAR: (no result)
APTT PPP: 28 SECONDS (ref 22–32)
AST SERPL W P-5'-P-CCNC: 39 U/L (ref 10–37)
BARBITURATES UR QL SCN: NEGATIVE
BASOPHILS # BLD AUTO: (no result) X10'3 (ref 0–0.2)
BASOPHILS # BLD AUTO: 0 X10'3 (ref 0–0.2)
BASOPHILS NFR BLD AUTO: (no result) % (ref 0–1)
BASOPHILS NFR BLD AUTO: 0.4 % (ref 0–1)
BENZODIAZ UR QL SCN: NEGATIVE
BILIRUB SERPL-MCNC: 0.7 MG/DL (ref 0.1–1)
BUN SERPL-MCNC: 42 MG/DL (ref 7–18)
BUN/CREAT SERPL: 20.8 (ref 6.6–38)
BZE UR QL SCN: NEGATIVE
CALCIUM SERPL-MCNC: 9.1 MG/DL (ref 8.5–10.1)
CANNABINOIDS UR QL SCN: NEGATIVE
CHLORIDE SERPL-SCNC: 103 MMOL/L (ref 99–107)
CO2 SERPL-SCNC: 25.7 MMOL/L (ref 24–32)
CREAT SERPL-MCNC: 2.02 MG/DL (ref 0.4–0.9)
D DIMER PPP FEU-MCNC: 2.66 MG/L FEU (ref 0–0.5)
EOSINOPHIL # BLD AUTO: (no result) X10'3 (ref 0–0.9)
EOSINOPHIL # BLD AUTO: 0 X10'3 (ref 0–0.9)
EOSINOPHIL NFR BLD AUTO: (no result) % (ref 0–6)
EOSINOPHIL NFR BLD AUTO: 0 % (ref 0–6)
ERYTHROCYTE [DISTWIDTH] IN BLOOD BY AUTOMATED COUNT: (no result) % (ref 11.5–14.5)
ERYTHROCYTE [DISTWIDTH] IN BLOOD BY AUTOMATED COUNT: 20.3 % (ref 11.5–14.5)
ETHANOL SERPL-MCNC: < 0.01 GM/DL (ref 0–0.01)
GFR SERPL CREATININE-BSD FRML MDRD: 32 ML/MIN
GLUCOSE SERPL-MCNC: 104 MG/DL (ref 70–104)
HCT VFR BLD AUTO: (no result) % (ref 35–45)
HCT VFR BLD AUTO: 30.7 % (ref 35–45)
HGB BLD-MCNC: (no result) G/DL (ref 12–16)
HGB BLD-MCNC: 9.5 G/DL (ref 12–16)
HYPOCHROMIA BLD QL SMEAR: (no result)
LYMPHOCYTES # BLD AUTO: (no result) X10'3 (ref 1.1–4.8)
LYMPHOCYTES # BLD AUTO: 1.8 X10'3 (ref 1.1–4.8)
LYMPHOCYTES NFR BLD AUTO: (no result) % (ref 21–51)
LYMPHOCYTES NFR BLD AUTO: 17.4 % (ref 21–51)
MAGNESIUM SERPL-MCNC: 1.9 MG/DL (ref 1.5–2.4)
MCH RBC QN AUTO: (no result) PG (ref 27–31)
MCH RBC QN AUTO: 22.6 PG (ref 27–31)
MCHC RBC AUTO-ENTMCNC: (no result) G/DL (ref 33–36.5)
MCHC RBC AUTO-ENTMCNC: 31 G/DL (ref 33–36.5)
MCV RBC AUTO: (no result) FL (ref 78–98)
MCV RBC AUTO: 72.9 FL (ref 78–98)
METHADONE UR QL SCN: NEGATIVE
MICROCYTES BLD QL SMEAR: (no result)
MONOCYTES # BLD AUTO: (no result) X10'3 (ref 0–0.9)
MONOCYTES # BLD AUTO: 0.7 X10'3 (ref 0–0.9)
MONOCYTES NFR BLD AUTO: (no result) % (ref 2–12)
MONOCYTES NFR BLD AUTO: 6.7 % (ref 2–12)
NEUTROPHILS # BLD AUTO: (no result) X10'3 (ref 1.8–7.7)
NEUTROPHILS # BLD AUTO: 7.7 X10'3 (ref 1.8–7.7)
NEUTROPHILS NFR BLD AUTO: (no result) % (ref 42–75)
NEUTROPHILS NFR BLD AUTO: 75.5 % (ref 42–75)
OPIATES UR QL SCN: NEGATIVE
PCP UR QL SCN: NEGATIVE
PHOSPHATE SERPL-MCNC: 4.6 MG/DL (ref 2.3–4.5)
PLATELET # BLD AUTO: (no result) X10'3 (ref 140–440)
PLATELET # BLD AUTO: 329 X10'3 (ref 140–440)
PLATELET BLD QL SMEAR: NORMAL
PMV BLD AUTO: (no result) FL (ref 7.4–10.4)
PMV BLD AUTO: 8.5 FL (ref 7.4–10.4)
POLYCHROMASIA BLD QL SMEAR: (no result)
POTASSIUM SERPL-SCNC: 4.3 MMOL/L (ref 3.5–5.1)
PROT SERPL-MCNC: 7.2 G/DL (ref 6.4–8.2)
RBC # BLD AUTO: (no result) X10'6 (ref 4.2–5.6)
RBC # BLD AUTO: 4.21 X10'6 (ref 4.2–5.6)
RBC MORPH BLD: (no result)
SODIUM SERPL-SCNC: 140 MMOL/L (ref 135–145)
TARGETS BLD QL SMEAR: (no result)
WBC # BLD AUTO: (no result) X10'3 (ref 4.5–11)
WBC # BLD AUTO: 10.3 X10'3 (ref 4.5–11)

## 2022-02-25 PROCEDURE — 96375 TX/PRO/DX INJ NEW DRUG ADDON: CPT

## 2022-02-25 PROCEDURE — 36415 COLL VENOUS BLD VENIPUNCTURE: CPT

## 2022-02-25 PROCEDURE — 96374 THER/PROPH/DIAG INJ IV PUSH: CPT

## 2022-02-25 PROCEDURE — 87081 CULTURE SCREEN ONLY: CPT

## 2022-02-25 PROCEDURE — 84484 ASSAY OF TROPONIN QUANT: CPT

## 2022-02-25 PROCEDURE — 80305 DRUG TEST PRSMV DIR OPT OBS: CPT

## 2022-02-25 PROCEDURE — 94660 CPAP INITIATION&MGMT: CPT

## 2022-02-25 PROCEDURE — 93005 ELECTROCARDIOGRAM TRACING: CPT

## 2022-02-25 PROCEDURE — 80053 COMPREHEN METABOLIC PANEL: CPT

## 2022-02-25 PROCEDURE — 83540 ASSAY OF IRON: CPT

## 2022-02-25 PROCEDURE — 80320 DRUG SCREEN QUANTALCOHOLS: CPT

## 2022-02-25 PROCEDURE — 83550 IRON BINDING TEST: CPT

## 2022-02-25 PROCEDURE — 71045 X-RAY EXAM CHEST 1 VIEW: CPT

## 2022-02-25 PROCEDURE — 85610 PROTHROMBIN TIME: CPT

## 2022-02-25 PROCEDURE — 83735 ASSAY OF MAGNESIUM: CPT

## 2022-02-25 PROCEDURE — 93306 TTE W/DOPPLER COMPLETE: CPT

## 2022-02-25 PROCEDURE — 85008 BL SMEAR W/O DIFF WBC COUNT: CPT

## 2022-02-25 PROCEDURE — 85730 THROMBOPLASTIN TIME PARTIAL: CPT

## 2022-02-25 PROCEDURE — 99291 CRITICAL CARE FIRST HOUR: CPT

## 2022-02-25 PROCEDURE — 85379 FIBRIN DEGRADATION QUANT: CPT

## 2022-02-25 PROCEDURE — 84100 ASSAY OF PHOSPHORUS: CPT

## 2022-02-25 PROCEDURE — 85025 COMPLETE CBC W/AUTO DIFF WBC: CPT

## 2022-02-25 PROCEDURE — 93970 EXTREMITY STUDY: CPT

## 2022-02-25 PROCEDURE — 94760 N-INVAS EAR/PLS OXIMETRY 1: CPT

## 2022-02-25 PROCEDURE — 83880 ASSAY OF NATRIURETIC PEPTIDE: CPT

## 2022-02-25 RX ADMIN — HYDROCODONE BITARTRATE AND ACETAMINOPHEN PRN TAB: 10; 325 TABLET ORAL at 22:38

## 2022-02-25 RX ADMIN — DOCUSATE SODIUM SCH MG: 100 CAPSULE, LIQUID FILLED ORAL at 20:00

## 2022-02-25 RX ADMIN — NICARDIPINE HYDROCHLORIDE SCH MLS/HR: 0.2 INJECTION, SOLUTION INTRAVENOUS at 23:10

## 2022-02-25 RX ADMIN — NICARDIPINE HYDROCHLORIDE SCH MLS/HR: 0.2 INJECTION, SOLUTION INTRAVENOUS at 17:49

## 2022-02-25 RX ADMIN — NICARDIPINE HYDROCHLORIDE SCH MLS/HR: 0.2 INJECTION, SOLUTION INTRAVENOUS at 16:16

## 2022-02-25 RX ADMIN — NITROGLYCERIN SCH MLS/HR: 20 INJECTION INTRAVENOUS at 20:55

## 2022-02-25 RX ADMIN — NITROGLYCERIN SCH MLS/HR: 20 INJECTION INTRAVENOUS at 13:20

## 2022-02-25 RX ADMIN — NICARDIPINE HYDROCHLORIDE SCH MLS/HR: 0.2 INJECTION, SOLUTION INTRAVENOUS at 20:57

## 2022-02-25 NOTE — NUR
ANKIT EVANS RN 

-------------------------------------------------------------------------------

Addendum: 02/25/22 at 2153 by Mona Marley RN

-------------------------------------------------------------------------------

ANKIT EVANS RN GAVE REPORT ON PATIENT, PATIENT ON NICARDIPINE DRIP AT 7.5 PER REPORT

## 2022-02-25 NOTE — NUR
Discussed pt's BP and nitro gtt with Dr. Elizabeth. Pt cannot go to PCU at current 
nitro gtt rate, pt still hypertensive. Orders to reduce nitro gtt to 100 
mcg/min, start nicardipine gtt and titrate nitro down.

## 2022-02-25 NOTE — NUR
RT came to bedside and left, reported based on their assessment pt did not need 
positive pressure intervention. Paged again due to pt's brief apneic periods, 
explained to RT why it was ordered.



MD notified of increased troponin, additionally discussed pt's respiratory 
status and continued HTN with MD. Nitro drip ordered for HTN. Asked MD about 
ABG prior to CPAP due to pt's decreased EtCO2. No orders for ABG at this time.

## 2022-02-25 NOTE — NUR
Pt dozing off with brief periods of apnea while asleep. Desat's to high 80's 
then wakes up and breathes. Oxygen initiated, EtCO2 monitoring initiated. MD 
notified.

## 2022-02-26 VITALS — SYSTOLIC BLOOD PRESSURE: 114 MMHG | DIASTOLIC BLOOD PRESSURE: 59 MMHG

## 2022-02-26 VITALS — DIASTOLIC BLOOD PRESSURE: 64 MMHG | SYSTOLIC BLOOD PRESSURE: 106 MMHG

## 2022-02-26 VITALS — DIASTOLIC BLOOD PRESSURE: 99 MMHG | SYSTOLIC BLOOD PRESSURE: 147 MMHG

## 2022-02-26 VITALS — SYSTOLIC BLOOD PRESSURE: 125 MMHG | DIASTOLIC BLOOD PRESSURE: 66 MMHG

## 2022-02-26 VITALS — SYSTOLIC BLOOD PRESSURE: 106 MMHG | DIASTOLIC BLOOD PRESSURE: 67 MMHG

## 2022-02-26 VITALS — SYSTOLIC BLOOD PRESSURE: 130 MMHG | DIASTOLIC BLOOD PRESSURE: 77 MMHG

## 2022-02-26 VITALS — SYSTOLIC BLOOD PRESSURE: 126 MMHG | DIASTOLIC BLOOD PRESSURE: 66 MMHG

## 2022-02-26 VITALS — SYSTOLIC BLOOD PRESSURE: 122 MMHG | DIASTOLIC BLOOD PRESSURE: 74 MMHG

## 2022-02-26 VITALS — SYSTOLIC BLOOD PRESSURE: 148 MMHG | DIASTOLIC BLOOD PRESSURE: 91 MMHG

## 2022-02-26 VITALS — DIASTOLIC BLOOD PRESSURE: 83 MMHG | SYSTOLIC BLOOD PRESSURE: 133 MMHG

## 2022-02-26 VITALS — SYSTOLIC BLOOD PRESSURE: 98 MMHG | DIASTOLIC BLOOD PRESSURE: 69 MMHG

## 2022-02-26 VITALS — DIASTOLIC BLOOD PRESSURE: 57 MMHG | SYSTOLIC BLOOD PRESSURE: 105 MMHG

## 2022-02-26 LAB
ALBUMIN SERPL BCP-MCNC: 2.3 G/DL (ref 3.4–5)
ALBUMIN/GLOB SERPL: 0.6 {RATIO} (ref 1.1–1.5)
ALP SERPL-CCNC: 123 IU/L (ref 46–116)
ALT SERPL W P-5'-P-CCNC: 36 U/L (ref 12–78)
ANION GAP SERPL CALCULATED.3IONS-SCNC: 13 MMOL/L (ref 8–16)
ANISOCYTOSIS BLD QL SMEAR: (no result)
AST SERPL W P-5'-P-CCNC: 32 U/L (ref 10–37)
BASOPHILS # BLD AUTO: 0 X10'3 (ref 0–0.2)
BASOPHILS NFR BLD AUTO: 0.4 % (ref 0–1)
BILIRUB SERPL-MCNC: 1.1 MG/DL (ref 0.1–1)
BUN SERPL-MCNC: 33 MG/DL (ref 7–18)
BUN/CREAT SERPL: 21.3 (ref 6.6–38)
CALCIUM SERPL-MCNC: 8.3 MG/DL (ref 8.5–10.1)
CHLORIDE SERPL-SCNC: 102 MMOL/L (ref 99–107)
CO2 SERPL-SCNC: 28.3 MMOL/L (ref 24–32)
CREAT SERPL-MCNC: 1.55 MG/DL (ref 0.4–0.9)
EOSINOPHIL # BLD AUTO: 0 X10'3 (ref 0–0.9)
EOSINOPHIL NFR BLD AUTO: 0.1 % (ref 0–6)
ERYTHROCYTE [DISTWIDTH] IN BLOOD BY AUTOMATED COUNT: 20.6 % (ref 11.5–14.5)
GFR SERPL CREATININE-BSD FRML MDRD: 44 ML/MIN
GLUCOSE SERPL-MCNC: 81 MG/DL (ref 70–104)
HCT VFR BLD AUTO: 28.1 % (ref 35–45)
HGB BLD-MCNC: 8.7 G/DL (ref 12–16)
HYPOCHROMIA BLD QL SMEAR: (no result)
LYMPHOCYTES # BLD AUTO: 1.7 X10'3 (ref 1.1–4.8)
LYMPHOCYTES NFR BLD AUTO: 17.7 % (ref 21–51)
MAGNESIUM SERPL-MCNC: 1.5 MG/DL (ref 1.5–2.4)
MCH RBC QN AUTO: 22.2 PG (ref 27–31)
MCHC RBC AUTO-ENTMCNC: 31.1 G/DL (ref 33–36.5)
MCV RBC AUTO: 71.4 FL (ref 78–98)
MICROCYTES BLD QL SMEAR: (no result)
MONOCYTES # BLD AUTO: 0.8 X10'3 (ref 0–0.9)
MONOCYTES NFR BLD AUTO: 8.3 % (ref 2–12)
NEUTROPHILS # BLD AUTO: 7 X10'3 (ref 1.8–7.7)
NEUTROPHILS NFR BLD AUTO: 73.5 % (ref 42–75)
PLATELET # BLD AUTO: 295 X10'3 (ref 140–440)
PLATELET BLD QL SMEAR: NORMAL
PMV BLD AUTO: 8.5 FL (ref 7.4–10.4)
POIKILOCYTOSIS BLD QL SMEAR: (no result)
POTASSIUM SERPL-SCNC: 2.8 MMOL/L (ref 3.5–5.1)
PROT SERPL-MCNC: 6.4 G/DL (ref 6.4–8.2)
RBC # BLD AUTO: 3.93 X10'6 (ref 4.2–5.6)
RBC MORPH BLD: (no result)
SODIUM SERPL-SCNC: 143 MMOL/L (ref 135–145)
TARGETS BLD QL SMEAR: (no result)
WBC # BLD AUTO: 9.5 X10'3 (ref 4.5–11)

## 2022-02-26 RX ADMIN — NITROGLYCERIN SCH MLS/HR: 20 INJECTION INTRAVENOUS at 19:40

## 2022-02-26 RX ADMIN — HYDROCODONE BITARTRATE AND ACETAMINOPHEN PRN TAB: 10; 325 TABLET ORAL at 16:36

## 2022-02-26 RX ADMIN — NITROGLYCERIN SCH MLS/HR: 20 INJECTION INTRAVENOUS at 04:30

## 2022-02-26 RX ADMIN — POTASSIUM CHLORIDE PRN MEQ: 1500 TABLET, EXTENDED RELEASE ORAL at 08:30

## 2022-02-26 RX ADMIN — HYDROCODONE BITARTRATE AND ACETAMINOPHEN PRN TAB: 10; 325 TABLET ORAL at 20:17

## 2022-02-26 RX ADMIN — POTASSIUM CHLORIDE PRN MEQ: 1500 TABLET, EXTENDED RELEASE ORAL at 13:00

## 2022-02-26 RX ADMIN — NICARDIPINE HYDROCHLORIDE SCH MLS/HR: 0.2 INJECTION, SOLUTION INTRAVENOUS at 01:50

## 2022-02-26 RX ADMIN — DOCUSATE SODIUM SCH MG: 100 CAPSULE, LIQUID FILLED ORAL at 08:29

## 2022-02-26 RX ADMIN — POTASSIUM CHLORIDE PRN MEQ: 1500 TABLET, EXTENDED RELEASE ORAL at 16:34

## 2022-02-26 RX ADMIN — DOCUSATE SODIUM SCH MG: 100 CAPSULE, LIQUID FILLED ORAL at 20:00

## 2022-02-26 RX ADMIN — NITROGLYCERIN SCH MLS/HR: 20 INJECTION INTRAVENOUS at 12:05

## 2022-02-26 RX ADMIN — POTASSIUM CHLORIDE PRN MEQ: 1500 TABLET, EXTENDED RELEASE ORAL at 08:29

## 2022-02-26 RX ADMIN — HYDROCODONE BITARTRATE AND ACETAMINOPHEN PRN TAB: 10; 325 TABLET ORAL at 08:49

## 2022-02-26 NOTE — NUR
Problems reprioritized. Patient report given, questions answered & plan of care reviewed 
with SURYA RN.

## 2022-02-26 NOTE — NUR
Patient in room . I have received report from SURYA ALLISON   and had the opportunity to 
ask questions and assume patient care.

## 2022-02-26 NOTE — NUR
PATIENT BECOMING HYPERTENSIVE; HAD CONVERSATION WITH PATIENT REGARDING USAGE OF FENTANYL 
PRIIOR TO HOSPITAL ADMISSION. PATIENT APPEARS TO BE HAVING WITHDRAWALS. PATIENT STATED SHE 
HAS OUTSIDE PROVIDER/CLINIC ASSISTING WITH WITHDRAWL MEDICATION, THOUGH PATIENT CANNOT 
RECALL DRUG GIVEN FOR WITHDRAWL/ADDICTION.

CALLED MD TO GET PRN MEDICATION FOR HTN. CLONIDINE 0.1 MG Q6H PRN ORDERED. PATIENT 
ASMINISTERED DRUG, WILL RECHECK AND MONITOR FOR BLOOD PRESSURE CHANGES. 

WILL GET CONSULT FOR .JORGE.

## 2022-02-26 NOTE — NUR
Page Sent

 



PAGER ID: [phone]

MESSAGE: Abdon Castillo  309 critical lab value K 2.8 FYI. -[facility] Student Nurse Ext: 
[phone]

## 2022-02-26 NOTE — NUR
PATIENTS BP TRENDING DOWN FROM 128/79 WHEN ARRIVED TO FLOOR AT 2224; CURRENT /57. MD DRUMMOND CALLED FOR ORDERS.

OLINDA STATED TO STOP NICARDIPINE GTT. 

IF PATIENTS BLOOD PRESSURE SUSTAINS ABOVE 140 SYSTOLICALLY, RESTART NICARDIPINE GTT AT 
2.5MG/HR. JORGE ALLISON

## 2022-02-27 VITALS — SYSTOLIC BLOOD PRESSURE: 162 MMHG | DIASTOLIC BLOOD PRESSURE: 102 MMHG

## 2022-02-27 VITALS — DIASTOLIC BLOOD PRESSURE: 104 MMHG | SYSTOLIC BLOOD PRESSURE: 171 MMHG

## 2022-02-27 VITALS — SYSTOLIC BLOOD PRESSURE: 177 MMHG | DIASTOLIC BLOOD PRESSURE: 118 MMHG

## 2022-02-27 LAB
ALBUMIN SERPL BCP-MCNC: 2.1 G/DL (ref 3.4–5)
ALBUMIN/GLOB SERPL: 0.6 {RATIO} (ref 1.1–1.5)
ALP SERPL-CCNC: 96 IU/L (ref 46–116)
ALT SERPL W P-5'-P-CCNC: 33 U/L (ref 12–78)
ANION GAP SERPL CALCULATED.3IONS-SCNC: 12 MMOL/L (ref 8–16)
ANISOCYTOSIS BLD QL SMEAR: (no result)
AST SERPL W P-5'-P-CCNC: 34 U/L (ref 10–37)
BASOPHILS # BLD AUTO: 0 X10'3 (ref 0–0.2)
BASOPHILS NFR BLD AUTO: 0.3 % (ref 0–1)
BILIRUB SERPL-MCNC: 0.7 MG/DL (ref 0.1–1)
BUN SERPL-MCNC: 27 MG/DL (ref 7–18)
BUN/CREAT SERPL: 19 (ref 6.6–38)
CALCIUM SERPL-MCNC: 8.2 MG/DL (ref 8.5–10.1)
CHLORIDE SERPL-SCNC: 103 MMOL/L (ref 99–107)
CO2 SERPL-SCNC: 30.2 MMOL/L (ref 24–32)
CREAT SERPL-MCNC: 1.42 MG/DL (ref 0.4–0.9)
EOSINOPHIL # BLD AUTO: 0 X10'3 (ref 0–0.9)
EOSINOPHIL NFR BLD AUTO: 0.2 % (ref 0–6)
ERYTHROCYTE [DISTWIDTH] IN BLOOD BY AUTOMATED COUNT: 20.8 % (ref 11.5–14.5)
GFR SERPL CREATININE-BSD FRML MDRD: 49 ML/MIN
GLUCOSE SERPL-MCNC: 108 MG/DL (ref 70–104)
HCT VFR BLD AUTO: 26.9 % (ref 35–45)
HGB BLD-MCNC: 8.4 G/DL (ref 12–16)
HYPOCHROMIA BLD QL SMEAR: (no result)
IRON SATN MFR SERPL: 314 UG/DL (ref 259–388)
IRON SATN MFR SERPL: 5 % (ref 11–46)
IRON SERPL-MCNC: 15 UG/DL (ref 49–151)
LYMPHOCYTES # BLD AUTO: 1.8 X10'3 (ref 1.1–4.8)
LYMPHOCYTES NFR BLD AUTO: 25.5 % (ref 21–51)
MAGNESIUM SERPL-MCNC: 1.4 MG/DL (ref 1.5–2.4)
MCH RBC QN AUTO: 22.4 PG (ref 27–31)
MCHC RBC AUTO-ENTMCNC: 31.4 G/DL (ref 33–36.5)
MCV RBC AUTO: 71.3 FL (ref 78–98)
MICROCYTES BLD QL SMEAR: (no result)
MONOCYTES # BLD AUTO: 0.5 X10'3 (ref 0–0.9)
MONOCYTES NFR BLD AUTO: 6.6 % (ref 2–12)
NEUTROPHILS # BLD AUTO: 4.8 X10'3 (ref 1.8–7.7)
NEUTROPHILS NFR BLD AUTO: 67.4 % (ref 42–75)
PLATELET # BLD AUTO: 237 X10'3 (ref 140–440)
PLATELET BLD QL SMEAR: NORMAL
PMV BLD AUTO: 8.3 FL (ref 7.4–10.4)
POLYCHROMASIA BLD QL SMEAR: (no result)
POTASSIUM SERPL-SCNC: 3.4 MMOL/L (ref 3.5–5.1)
PROT SERPL-MCNC: 5.9 G/DL (ref 6.4–8.2)
RBC # BLD AUTO: 3.77 X10'6 (ref 4.2–5.6)
RBC MORPH BLD: (no result)
SODIUM SERPL-SCNC: 145 MMOL/L (ref 135–145)
STOMATOCYTES BLD QL SMEAR: (no result)
WBC # BLD AUTO: 7.1 X10'3 (ref 4.5–11)

## 2022-02-27 RX ADMIN — DOCUSATE SODIUM SCH MG: 100 CAPSULE, LIQUID FILLED ORAL at 09:25

## 2022-02-27 RX ADMIN — HYDROCODONE BITARTRATE AND ACETAMINOPHEN PRN TAB: 10; 325 TABLET ORAL at 05:17

## 2022-02-27 RX ADMIN — HYDROCODONE BITARTRATE AND ACETAMINOPHEN PRN TAB: 10; 325 TABLET ORAL at 09:27

## 2022-02-27 RX ADMIN — HYDROCODONE BITARTRATE AND ACETAMINOPHEN PRN TAB: 10; 325 TABLET ORAL at 00:28

## 2022-02-27 NOTE — NUR
Problems reprioritized. Patient report given, questions answered & plan of care reviewed 
with SURAY RN.

## 2022-02-27 NOTE — NUR
Page Sent

 

PAGER ID: [phone]

MESSAGE: Pt [facility] Room 309 is willing to stay to speak with you. She admitted to 
using fentanyl daily so she is withdrawing. She only has 0.1 of clonidine. [facility] ACCE 2113

## 2022-02-27 NOTE — NUR
Pt left AMA Page Accepted



Message: Pt Anna Castillo in Rm 309 just left AMA. AMA paper signed, Mari and mamadou out. 
Was able to get pt to take AM meds except Iron infusion.

## 2022-02-27 NOTE — NUR
pt left hospital AMA. Educated pt on the risks and consequences in leaving. Pt fully 
understands and agrees to accept the risks in leaving, and she signed the AMA paperwork. IVs 
removed in bilateral AC and cedillo removed. Pt left with all her belongings (purse, cell 
phone, white robe, green skirt and white tank top. Pt did not have any shoes and wore the 
hospital socks out. MD navarrete.

## 2022-02-27 NOTE — NUR
Pt is trying to take O2 sensor and leads off stating she going to leave AMA because she 
wants to go smoke. Educated pt about her blood pressure and the dangers of leaving hospital. 
Pt says she will leave with the cedillo or remove it herslef. 

Paged MD 



Page Sent

PAGER ID: [phone]

MESSAGE: Pt Rosio Nielsen in rm 309 is leaving AMA. She is trying to pull out cedillo herself. 
I am removing. [facility] ACCE [id number]

## 2022-02-27 NOTE — NUR
Page Accepted



Message: Pt [facility] [place] Room 309 is wanting to leave AMA. She admitted to using 
fentanyl daily before admission. She is refusing to keep leads on last BP at 0600 was 
177/118. She only has 0.1 of clonidine, maybe some Ativan? [facility] ACCE [id number]

## 2022-02-27 NOTE — NUR
PATIENTS BLOOD PRESSURE 164/109-CALLED BHANU TO INQUIRE ABOUT BLOOD PRESSURE MANAGEMENT; 
HE STATED TO REMOVE TRIDIL GTT FROM EMAR PER ROBACK ORDERS, SBP 'S ACCEPTABLE, 
CONTINUE CLONIDINE FOR BP MANAGEMENT. MONITOR BP FOR CHANGES.

JORGE ALLISON

## 2022-03-16 ENCOUNTER — HOSPITAL ENCOUNTER (INPATIENT)
Dept: HOSPITAL 94 - ER | Age: 47
LOS: 1 days | Discharge: LEFT BEFORE DISCHARGE | DRG: 194 | End: 2022-03-17
Attending: INTERNAL MEDICINE | Admitting: INTERNAL MEDICINE
Payer: MEDICAID

## 2022-03-16 VITALS — HEIGHT: 71 IN | WEIGHT: 293 LBS | BODY MASS INDEX: 41.02 KG/M2

## 2022-03-16 VITALS — SYSTOLIC BLOOD PRESSURE: 161 MMHG | DIASTOLIC BLOOD PRESSURE: 105 MMHG

## 2022-03-16 VITALS — SYSTOLIC BLOOD PRESSURE: 163 MMHG | DIASTOLIC BLOOD PRESSURE: 120 MMHG

## 2022-03-16 DIAGNOSIS — E78.00: ICD-10-CM

## 2022-03-16 DIAGNOSIS — L97.929: ICD-10-CM

## 2022-03-16 DIAGNOSIS — G89.29: ICD-10-CM

## 2022-03-16 DIAGNOSIS — Z82.49: ICD-10-CM

## 2022-03-16 DIAGNOSIS — Z79.899: ICD-10-CM

## 2022-03-16 DIAGNOSIS — F17.210: ICD-10-CM

## 2022-03-16 DIAGNOSIS — N18.9: ICD-10-CM

## 2022-03-16 DIAGNOSIS — Z53.29: ICD-10-CM

## 2022-03-16 DIAGNOSIS — Z98.51: ICD-10-CM

## 2022-03-16 DIAGNOSIS — I13.0: Primary | ICD-10-CM

## 2022-03-16 DIAGNOSIS — F11.20: ICD-10-CM

## 2022-03-16 DIAGNOSIS — L97.919: ICD-10-CM

## 2022-03-16 DIAGNOSIS — I50.23: ICD-10-CM

## 2022-03-16 DIAGNOSIS — E78.5: ICD-10-CM

## 2022-03-16 DIAGNOSIS — Z20.822: ICD-10-CM

## 2022-03-16 DIAGNOSIS — F41.9: ICD-10-CM

## 2022-03-16 DIAGNOSIS — Z86.718: ICD-10-CM

## 2022-03-16 LAB
ALBUMIN SERPL BCP-MCNC: 2.6 G/DL (ref 3.4–5)
ALBUMIN/GLOB SERPL: 0.5 {RATIO} (ref 1.1–1.5)
ALP SERPL-CCNC: 119 IU/L (ref 46–116)
ALT SERPL W P-5'-P-CCNC: 28 U/L (ref 12–78)
ANION GAP SERPL CALCULATED.3IONS-SCNC: 8 MMOL/L (ref 8–16)
ANISOCYTOSIS BLD QL SMEAR: (no result)
AST SERPL W P-5'-P-CCNC: 35 U/L (ref 10–37)
BASOPHILS # BLD AUTO: 0.1 X10'3 (ref 0–0.2)
BASOPHILS NFR BLD AUTO: 1.1 % (ref 0–1)
BILIRUB SERPL-MCNC: 0.3 MG/DL (ref 0.1–1)
BUN SERPL-MCNC: 23 MG/DL (ref 7–18)
BUN/CREAT SERPL: 21.9 (ref 6.6–38)
CALCIUM SERPL-MCNC: 8.6 MG/DL (ref 8.5–10.1)
CHLORIDE SERPL-SCNC: 107 MMOL/L (ref 99–107)
CO2 SERPL-SCNC: 27 MMOL/L (ref 24–32)
CREAT SERPL-MCNC: 1.05 MG/DL (ref 0.4–0.9)
EOSINOPHIL # BLD AUTO: 0.1 X10'3 (ref 0–0.9)
EOSINOPHIL NFR BLD AUTO: 0.8 % (ref 0–6)
ERYTHROCYTE [DISTWIDTH] IN BLOOD BY AUTOMATED COUNT: 21.6 % (ref 11.5–14.5)
GFR SERPL CREATININE-BSD FRML MDRD: 69 ML/MIN
GLUCOSE SERPL-MCNC: 87 MG/DL (ref 70–104)
HCT VFR BLD AUTO: 29.7 % (ref 35–45)
HGB BLD-MCNC: 9.1 G/DL (ref 12–16)
HYPOCHROMIA BLD QL SMEAR: (no result)
LYMPHOCYTES # BLD AUTO: 2.8 X10'3 (ref 1.1–4.8)
LYMPHOCYTES NFR BLD AUTO: 33.5 % (ref 21–51)
MAGNESIUM SERPL-MCNC: 1.7 MG/DL (ref 1.5–2.4)
MCH RBC QN AUTO: 21.4 PG (ref 27–31)
MCHC RBC AUTO-ENTMCNC: 30.5 G/DL (ref 33–36.5)
MCV RBC AUTO: 70.3 FL (ref 78–98)
MICROCYTES BLD QL SMEAR: (no result)
MONOCYTES # BLD AUTO: 0.4 X10'3 (ref 0–0.9)
MONOCYTES NFR BLD AUTO: 4.5 % (ref 2–12)
NEUTROPHILS # BLD AUTO: 5 X10'3 (ref 1.8–7.7)
NEUTROPHILS NFR BLD AUTO: 60.1 % (ref 42–75)
OVALOCYTES BLD QL SMEAR: (no result)
PLATELET # BLD AUTO: 391 X10'3 (ref 140–440)
PLATELET BLD QL SMEAR: NORMAL
PMV BLD AUTO: 7.8 FL (ref 7.4–10.4)
POIKILOCYTOSIS BLD QL SMEAR: (no result)
POLYCHROMASIA BLD QL SMEAR: (no result)
POTASSIUM SERPL-SCNC: 3 MMOL/L (ref 3.5–5.1)
POTASSIUM SERPL-SCNC: 3.1 MMOL/L (ref 3.5–5.1)
PROT SERPL-MCNC: 7.6 G/DL (ref 6.4–8.2)
RBC # BLD AUTO: 4.23 X10'6 (ref 4.2–5.6)
RBC MORPH BLD: (no result)
SODIUM SERPL-SCNC: 142 MMOL/L (ref 135–145)
STOMATOCYTES BLD QL SMEAR: (no result)
TARGETS BLD QL SMEAR: (no result)
WBC # BLD AUTO: 8.3 X10'3 (ref 4.5–11)

## 2022-03-16 PROCEDURE — 96374 THER/PROPH/DIAG INJ IV PUSH: CPT

## 2022-03-16 PROCEDURE — 85008 BL SMEAR W/O DIFF WBC COUNT: CPT

## 2022-03-16 PROCEDURE — 87081 CULTURE SCREEN ONLY: CPT

## 2022-03-16 PROCEDURE — 36415 COLL VENOUS BLD VENIPUNCTURE: CPT

## 2022-03-16 PROCEDURE — 84132 ASSAY OF SERUM POTASSIUM: CPT

## 2022-03-16 PROCEDURE — 80053 COMPREHEN METABOLIC PANEL: CPT

## 2022-03-16 PROCEDURE — 84484 ASSAY OF TROPONIN QUANT: CPT

## 2022-03-16 PROCEDURE — 87635 SARS-COV-2 COVID-19 AMP PRB: CPT

## 2022-03-16 PROCEDURE — 83880 ASSAY OF NATRIURETIC PEPTIDE: CPT

## 2022-03-16 PROCEDURE — 83735 ASSAY OF MAGNESIUM: CPT

## 2022-03-16 PROCEDURE — 85025 COMPLETE CBC W/AUTO DIFF WBC: CPT

## 2022-03-16 PROCEDURE — 99285 EMERGENCY DEPT VISIT HI MDM: CPT

## 2022-03-16 PROCEDURE — 96376 TX/PRO/DX INJ SAME DRUG ADON: CPT

## 2022-03-16 PROCEDURE — 96375 TX/PRO/DX INJ NEW DRUG ADDON: CPT

## 2022-03-16 PROCEDURE — 71045 X-RAY EXAM CHEST 1 VIEW: CPT

## 2022-03-16 PROCEDURE — 93005 ELECTROCARDIOGRAM TRACING: CPT

## 2022-03-16 RX ADMIN — POTASSIUM CHLORIDE PRN MEQ: 1500 TABLET, EXTENDED RELEASE ORAL at 17:35

## 2022-03-16 RX ADMIN — POTASSIUM CHLORIDE PRN MEQ: 1500 TABLET, EXTENDED RELEASE ORAL at 23:38

## 2022-03-16 RX ADMIN — HYDROCODONE BITARTRATE AND ACETAMINOPHEN PRN TAB: 10; 325 TABLET ORAL at 13:14

## 2022-03-16 RX ADMIN — POTASSIUM CHLORIDE PRN MEQ: 1500 TABLET, EXTENDED RELEASE ORAL at 13:15

## 2022-03-16 RX ADMIN — HYDRALAZINE HYDROCHLORIDE PRN MG: 20 INJECTION INTRAMUSCULAR; INTRAVENOUS at 17:56

## 2022-03-16 RX ADMIN — HYDROCODONE BITARTRATE AND ACETAMINOPHEN PRN TAB: 10; 325 TABLET ORAL at 20:23

## 2022-03-16 NOTE — NUR
Problems reprioritized. Patient report given, questions answered & plan of care reviewed 
with Amaris ALLISON.

## 2022-03-16 NOTE — NUR
PAGER ID:  [phone] 

MESSAGE:  Re: Anna Castillo. Room: 3009. Pt has no PRN pain meds. Pt complaining pf 
severe sheila leg pain. Received Morphine in ED and it helped. -[facility] #[id number]



-Dr. Behl paged concerning Pt's pain level.

## 2022-03-16 NOTE — NUR
PAGER ID:  [phone] 

MESSAGE:  Re: Anan Castillo. Room: Pt's home med rec complete and ready to be reviewed. 
-[facility] #[phone]



-Dr. Behl paged concerning pt's home med rec.

## 2022-03-16 NOTE — NUR
Orientee documentation:

I have reviewed and agree with all interventions, assessments performed and documented by 
Lacie Singh RN.

## 2022-03-16 NOTE — NUR
Problems reprioritized. Patient report given, questions answered & plan of care reviewed 
with KEEGAN Glez.

## 2022-03-16 NOTE — NUR
Paged hospitalist: PAGER ID: [phone]

MESSAGE: Room: 3009: Jonathan: Pt's reporting 10/10 BLE pain. I'd like to request morphine 
pain medicine. The pt states that this helped with her pain in the ED. Lacie RN [phone]

## 2022-03-16 NOTE — NUR
NURSING SUPERVISOR CALLED REGARDING PT'S BP PRIOR TO BED ASSIGNEMENT OF 
210/128.

BP RETAKEN; 193/128, NURSING SUPERVISOR NOTIFIED AND REQUESTED THAT HOSPITALIST 
BE INFORMED.

DR BEHEL PAGED.

-------------------------------------------------------------------------------

Addendum: 03/16/22 at 1109 by ROSY

-------------------------------------------------------------------------------

DR BEHL RETURNED PAGE, ORDER FOR 10MG HYDRALAZINE IV x1 GIVEN.  WAS UNABLE TO 
PLACE ORDER UNDER DR BEHL'S NAME AND CONSULTED DR JORDAN WHO PLACED NEW 
MEDICATION ORDER ALONG WITH AN ADDITIONAL MEDICATION FOR PT'S ELEVATED BY; 
CARVEDILOL 25MG PO x1.

## 2022-03-16 NOTE — NUR
PAGER ID: [phone]

MESSAGE: Re: Anna Castillo. Room: Pt's current BP is 200/118. No scheduled or PRN blood 
pressure meds ordered. -[facility] #[id number]



-Dr. Behl paged concerning pt's BP

## 2022-03-16 NOTE — NUR
PHLEBOTOMIST UNSUCCESSFULLY ATTEMPTED TO DRAW LABS THREE TIMES. RN 
UNSUCCESFULLY ATTEMPTED TO DRAW LABS X2. SECOND RN IN ROOM ATTEMPTING TO DRAW 
LABS.

## 2022-03-17 VITALS — SYSTOLIC BLOOD PRESSURE: 193 MMHG

## 2022-03-17 RX ADMIN — HYDRALAZINE HYDROCHLORIDE PRN MG: 20 INJECTION INTRAMUSCULAR; INTRAVENOUS at 02:36

## 2022-03-17 RX ADMIN — HYDROCODONE BITARTRATE AND ACETAMINOPHEN PRN TAB: 10; 325 TABLET ORAL at 02:37

## 2022-03-17 NOTE — NUR
0245 patient  complaining of pain and BP was elevated,  administered pain medication PO  and 
 hydralazine . patient verbalized she would like to go home and have a cigarette, risks of 
leaving before discharge was explained to patient.

0300 while changing the patient i found a pipe  at her groin area that seems to be used for 
drugs  also found patient with a lighter and was also protective of her purse. security 
notified around 0310. in the presence of security,  I requested patient  if i could go 
through her purse. Patient refused and verbalized she was going home. patient quickly 
changed to her clothes and peripheral iv was taken out and a dressing placed patient a 
patient collected her belongings and was escorted out of the building by the security.